# Patient Record
Sex: FEMALE | Race: AMERICAN INDIAN OR ALASKA NATIVE | NOT HISPANIC OR LATINO
[De-identification: names, ages, dates, MRNs, and addresses within clinical notes are randomized per-mention and may not be internally consistent; named-entity substitution may affect disease eponyms.]

---

## 2017-04-13 ENCOUNTER — APPOINTMENT (OUTPATIENT)
Dept: UROLOGY | Facility: CLINIC | Age: 47
End: 2017-04-13

## 2017-06-01 ENCOUNTER — OUTPATIENT (OUTPATIENT)
Dept: OUTPATIENT SERVICES | Facility: HOSPITAL | Age: 47
LOS: 1 days | Discharge: HOME | End: 2017-06-01

## 2017-06-26 ENCOUNTER — OUTPATIENT (OUTPATIENT)
Dept: OUTPATIENT SERVICES | Facility: HOSPITAL | Age: 47
LOS: 1 days | Discharge: HOME | End: 2017-06-26

## 2017-06-28 DIAGNOSIS — N39.0 URINARY TRACT INFECTION, SITE NOT SPECIFIED: ICD-10-CM

## 2017-07-06 ENCOUNTER — OUTPATIENT (OUTPATIENT)
Dept: OUTPATIENT SERVICES | Facility: HOSPITAL | Age: 47
LOS: 1 days | Discharge: HOME | End: 2017-07-06

## 2017-07-06 DIAGNOSIS — N39.0 URINARY TRACT INFECTION, SITE NOT SPECIFIED: ICD-10-CM

## 2017-07-10 ENCOUNTER — OUTPATIENT (OUTPATIENT)
Dept: OUTPATIENT SERVICES | Facility: HOSPITAL | Age: 47
LOS: 1 days | Discharge: HOME | End: 2017-07-10

## 2017-07-10 DIAGNOSIS — N39.0 URINARY TRACT INFECTION, SITE NOT SPECIFIED: ICD-10-CM

## 2017-07-13 ENCOUNTER — OUTPATIENT (OUTPATIENT)
Dept: OUTPATIENT SERVICES | Facility: HOSPITAL | Age: 47
LOS: 1 days | Discharge: HOME | End: 2017-07-13

## 2017-07-14 DIAGNOSIS — N39.0 URINARY TRACT INFECTION, SITE NOT SPECIFIED: ICD-10-CM

## 2018-01-18 ENCOUNTER — APPOINTMENT (OUTPATIENT)
Dept: UROGYNECOLOGY | Facility: CLINIC | Age: 48
End: 2018-01-18

## 2018-01-18 ENCOUNTER — OUTPATIENT (OUTPATIENT)
Dept: OUTPATIENT SERVICES | Facility: HOSPITAL | Age: 48
LOS: 1 days | Discharge: HOME | End: 2018-01-18

## 2018-01-18 VITALS
HEART RATE: 74 BPM | SYSTOLIC BLOOD PRESSURE: 136 MMHG | BODY MASS INDEX: 24.07 KG/M2 | WEIGHT: 141 LBS | HEIGHT: 64 IN | DIASTOLIC BLOOD PRESSURE: 84 MMHG

## 2018-01-18 DIAGNOSIS — M51.26 OTHER INTERVERTEBRAL DISC DISPLACEMENT, LUMBAR REGION: ICD-10-CM

## 2018-01-19 DIAGNOSIS — R33.9 RETENTION OF URINE, UNSPECIFIED: ICD-10-CM

## 2018-02-01 ENCOUNTER — OTHER (OUTPATIENT)
Age: 48
End: 2018-02-01

## 2018-02-02 ENCOUNTER — OUTPATIENT (OUTPATIENT)
Dept: OUTPATIENT SERVICES | Facility: HOSPITAL | Age: 48
LOS: 1 days | Discharge: HOME | End: 2018-02-02

## 2018-02-02 DIAGNOSIS — R33.9 RETENTION OF URINE, UNSPECIFIED: ICD-10-CM

## 2018-02-25 ENCOUNTER — RESULT CHARGE (OUTPATIENT)
Age: 48
End: 2018-02-25

## 2018-02-26 ENCOUNTER — APPOINTMENT (OUTPATIENT)
Age: 48
End: 2018-02-26

## 2018-02-26 ENCOUNTER — OUTPATIENT (OUTPATIENT)
Dept: OUTPATIENT SERVICES | Facility: HOSPITAL | Age: 48
LOS: 1 days | Discharge: HOME | End: 2018-02-26

## 2018-02-26 RX ORDER — BACLOFEN 10 MG/1
10 TABLET ORAL
Qty: 60 | Refills: 1 | Status: DISCONTINUED | COMMUNITY
Start: 2018-01-18 | End: 2018-02-26

## 2018-02-26 RX ORDER — TAMSULOSIN HYDROCHLORIDE 0.4 MG/1
0.4 CAPSULE ORAL
Qty: 30 | Refills: 1 | Status: DISCONTINUED | COMMUNITY
Start: 2018-01-18 | End: 2018-02-26

## 2018-02-27 DIAGNOSIS — N39.0 URINARY TRACT INFECTION, SITE NOT SPECIFIED: ICD-10-CM

## 2018-02-27 LAB
BILIRUB UR QL STRIP: NORMAL
CLARITY UR: CLEAR
COLLECTION METHOD: NORMAL
GLUCOSE UR-MCNC: NORMAL
HCG UR QL: NORMAL EU/DL
HGB UR QL STRIP.AUTO: NORMAL
KETONES UR-MCNC: NORMAL
LEUKOCYTE ESTERASE UR QL STRIP: 500
NITRITE UR QL STRIP: NORMAL
PH UR STRIP: 9
PROT UR STRIP-MCNC: 30
SP GR UR STRIP: 1.01

## 2018-03-02 LAB — BACTERIA UR CULT: ABNORMAL

## 2018-03-06 ENCOUNTER — APPOINTMENT (OUTPATIENT)
Dept: UROLOGY | Facility: CLINIC | Age: 48
End: 2018-03-06
Payer: COMMERCIAL

## 2018-03-06 VITALS
WEIGHT: 141 LBS | HEIGHT: 64 IN | DIASTOLIC BLOOD PRESSURE: 78 MMHG | HEART RATE: 76 BPM | BODY MASS INDEX: 24.07 KG/M2 | SYSTOLIC BLOOD PRESSURE: 136 MMHG

## 2018-03-06 PROCEDURE — 99243 OFF/OP CNSLTJ NEW/EST LOW 30: CPT

## 2018-03-16 ENCOUNTER — APPOINTMENT (OUTPATIENT)
Dept: UROLOGY | Facility: CLINIC | Age: 48
End: 2018-03-16
Payer: COMMERCIAL

## 2018-03-16 VITALS
WEIGHT: 141 LBS | HEART RATE: 79 BPM | HEIGHT: 64 IN | DIASTOLIC BLOOD PRESSURE: 77 MMHG | SYSTOLIC BLOOD PRESSURE: 147 MMHG | BODY MASS INDEX: 24.07 KG/M2

## 2018-03-16 DIAGNOSIS — Z00.00 ENCOUNTER FOR GENERAL ADULT MEDICAL EXAMINATION W/OUT ABNORMAL FINDINGS: ICD-10-CM

## 2018-03-16 PROCEDURE — 99213 OFFICE O/P EST LOW 20 MIN: CPT

## 2018-05-07 ENCOUNTER — OUTPATIENT (OUTPATIENT)
Dept: OUTPATIENT SERVICES | Facility: HOSPITAL | Age: 48
LOS: 1 days | Discharge: HOME | End: 2018-05-07

## 2018-05-07 ENCOUNTER — APPOINTMENT (OUTPATIENT)
Age: 48
End: 2018-05-07

## 2018-05-07 VITALS
BODY MASS INDEX: 24.75 KG/M2 | HEIGHT: 64 IN | DIASTOLIC BLOOD PRESSURE: 84 MMHG | HEART RATE: 65 BPM | WEIGHT: 145 LBS | SYSTOLIC BLOOD PRESSURE: 131 MMHG

## 2018-05-07 DIAGNOSIS — Z87.898 PERSONAL HISTORY OF OTHER SPECIFIED CONDITIONS: ICD-10-CM

## 2018-05-07 DIAGNOSIS — Z87.440 PERSONAL HISTORY OF URINARY (TRACT) INFECTIONS: ICD-10-CM

## 2018-05-07 DIAGNOSIS — Z96.0 PRESENCE OF UROGENITAL IMPLANTS: ICD-10-CM

## 2018-05-07 RX ORDER — NITROFURANTOIN MACROCRYSTALS 100 MG/1
100 CAPSULE ORAL
Qty: 14 | Refills: 0 | Status: DISCONTINUED | COMMUNITY
Start: 2018-02-12 | End: 2018-05-07

## 2018-05-07 RX ORDER — CEFUROXIME AXETIL 250 MG/1
250 TABLET ORAL
Qty: 14 | Refills: 0 | Status: DISCONTINUED | COMMUNITY
Start: 2017-10-13 | End: 2018-05-07

## 2018-05-07 RX ORDER — NITROFURANTOIN MACROCRYSTALS 100 MG/1
100 CAPSULE ORAL
Qty: 14 | Refills: 0 | Status: DISCONTINUED | COMMUNITY
Start: 2018-02-26 | End: 2018-05-07

## 2018-05-08 DIAGNOSIS — R33.9 RETENTION OF URINE, UNSPECIFIED: ICD-10-CM

## 2018-05-10 LAB — BACTERIA UR CULT: ABNORMAL

## 2018-06-14 ENCOUNTER — APPOINTMENT (OUTPATIENT)
Age: 48
End: 2018-06-14

## 2018-06-14 VITALS
WEIGHT: 144 LBS | BODY MASS INDEX: 24.59 KG/M2 | HEIGHT: 64 IN | SYSTOLIC BLOOD PRESSURE: 128 MMHG | HEART RATE: 67 BPM | DIASTOLIC BLOOD PRESSURE: 78 MMHG

## 2018-09-04 ENCOUNTER — APPOINTMENT (OUTPATIENT)
Dept: UROLOGY | Facility: CLINIC | Age: 48
End: 2018-09-04

## 2018-09-20 ENCOUNTER — APPOINTMENT (OUTPATIENT)
Age: 48
End: 2018-09-20

## 2018-10-15 ENCOUNTER — APPOINTMENT (OUTPATIENT)
Dept: UROLOGY | Facility: CLINIC | Age: 48
End: 2018-10-15
Payer: COMMERCIAL

## 2018-10-15 VITALS
WEIGHT: 144 LBS | DIASTOLIC BLOOD PRESSURE: 73 MMHG | HEIGHT: 64 IN | BODY MASS INDEX: 24.59 KG/M2 | SYSTOLIC BLOOD PRESSURE: 127 MMHG | HEART RATE: 69 BPM

## 2018-10-15 DIAGNOSIS — N20.0 CALCULUS OF KIDNEY: ICD-10-CM

## 2018-10-15 DIAGNOSIS — Z78.9 OTHER SPECIFIED HEALTH STATUS: ICD-10-CM

## 2018-10-15 DIAGNOSIS — N32.9 BLADDER DISORDER, UNSPECIFIED: ICD-10-CM

## 2018-10-15 PROCEDURE — 99214 OFFICE O/P EST MOD 30 MIN: CPT

## 2018-12-03 ENCOUNTER — APPOINTMENT (OUTPATIENT)
Dept: UROLOGY | Facility: CLINIC | Age: 48
End: 2018-12-03
Payer: COMMERCIAL

## 2018-12-03 PROCEDURE — 52000 CYSTOURETHROSCOPY: CPT

## 2018-12-04 ENCOUNTER — APPOINTMENT (OUTPATIENT)
Dept: UROLOGY | Facility: CLINIC | Age: 48
End: 2018-12-04
Payer: COMMERCIAL

## 2018-12-04 VITALS
HEIGHT: 64 IN | DIASTOLIC BLOOD PRESSURE: 84 MMHG | SYSTOLIC BLOOD PRESSURE: 136 MMHG | BODY MASS INDEX: 24.59 KG/M2 | HEART RATE: 75 BPM | WEIGHT: 144 LBS

## 2018-12-04 PROCEDURE — 99213 OFFICE O/P EST LOW 20 MIN: CPT

## 2018-12-11 ENCOUNTER — APPOINTMENT (OUTPATIENT)
Dept: UROLOGY | Facility: HOSPITAL | Age: 48
End: 2018-12-11
Payer: COMMERCIAL

## 2018-12-11 ENCOUNTER — RESULT REVIEW (OUTPATIENT)
Age: 48
End: 2018-12-11

## 2018-12-11 ENCOUNTER — OUTPATIENT (OUTPATIENT)
Dept: OUTPATIENT SERVICES | Facility: HOSPITAL | Age: 48
LOS: 1 days | Discharge: HOME | End: 2018-12-11

## 2018-12-11 VITALS
WEIGHT: 139.99 LBS | DIASTOLIC BLOOD PRESSURE: 80 MMHG | HEIGHT: 64 IN | RESPIRATION RATE: 17 BRPM | OXYGEN SATURATION: 100 % | HEART RATE: 80 BPM | TEMPERATURE: 97 F | SYSTOLIC BLOOD PRESSURE: 136 MMHG

## 2018-12-11 VITALS — SYSTOLIC BLOOD PRESSURE: 120 MMHG | DIASTOLIC BLOOD PRESSURE: 76 MMHG | HEART RATE: 64 BPM

## 2018-12-11 DIAGNOSIS — N32.9 BLADDER DISORDER, UNSPECIFIED: ICD-10-CM

## 2018-12-11 DIAGNOSIS — Z98.891 HISTORY OF UTERINE SCAR FROM PREVIOUS SURGERY: Chronic | ICD-10-CM

## 2018-12-11 DIAGNOSIS — D41.4 NEOPLASM OF UNCERTAIN BEHAVIOR OF BLADDER: ICD-10-CM

## 2018-12-11 PROCEDURE — 52204 CYSTOSCOPY W/BIOPSY(S): CPT

## 2018-12-11 RX ORDER — SODIUM CHLORIDE 9 MG/ML
1000 INJECTION, SOLUTION INTRAVENOUS
Qty: 0 | Refills: 0 | Status: DISCONTINUED | OUTPATIENT
Start: 2018-12-11 | End: 2018-12-26

## 2018-12-11 RX ORDER — OXYCODONE AND ACETAMINOPHEN 5; 325 MG/1; MG/1
1 TABLET ORAL ONCE
Qty: 0 | Refills: 0 | Status: DISCONTINUED | OUTPATIENT
Start: 2018-12-11 | End: 2018-12-11

## 2018-12-11 RX ORDER — MORPHINE SULFATE 50 MG/1
2 CAPSULE, EXTENDED RELEASE ORAL
Qty: 0 | Refills: 0 | Status: DISCONTINUED | OUTPATIENT
Start: 2018-12-11 | End: 2018-12-11

## 2018-12-11 RX ORDER — ONDANSETRON 8 MG/1
4 TABLET, FILM COATED ORAL ONCE
Qty: 0 | Refills: 0 | Status: DISCONTINUED | OUTPATIENT
Start: 2018-12-11 | End: 2018-12-26

## 2018-12-11 RX ORDER — HYDROMORPHONE HYDROCHLORIDE 2 MG/ML
0.5 INJECTION INTRAMUSCULAR; INTRAVENOUS; SUBCUTANEOUS
Qty: 0 | Refills: 0 | Status: DISCONTINUED | OUTPATIENT
Start: 2018-12-11 | End: 2018-12-11

## 2018-12-11 RX ORDER — PHENAZOPYRIDINE HCL 100 MG
1 TABLET ORAL
Qty: 4 | Refills: 0
Start: 2018-12-11 | End: 2018-12-12

## 2018-12-11 RX ADMIN — OXYCODONE AND ACETAMINOPHEN 1 TABLET(S): 5; 325 TABLET ORAL at 10:55

## 2018-12-11 RX ADMIN — SODIUM CHLORIDE 100 MILLILITER(S): 9 INJECTION, SOLUTION INTRAVENOUS at 10:39

## 2018-12-11 RX ADMIN — HYDROMORPHONE HYDROCHLORIDE 0.5 MILLIGRAM(S): 2 INJECTION INTRAMUSCULAR; INTRAVENOUS; SUBCUTANEOUS at 10:55

## 2018-12-11 RX ADMIN — HYDROMORPHONE HYDROCHLORIDE 0.5 MILLIGRAM(S): 2 INJECTION INTRAMUSCULAR; INTRAVENOUS; SUBCUTANEOUS at 10:45

## 2018-12-11 NOTE — ASU DISCHARGE PLAN (ADULT/PEDIATRIC). - MEDICATION SUMMARY - MEDICATIONS TO TAKE
I will START or STAY ON the medications listed below when I get home from the hospital:    Pyridium 100 mg oral tablet  -- 1 tab(s) by mouth 2 times a day -for bladder spasms   -- May discolor urine or feces.  Medication should be taken with plenty of water.  Take with food or milk.    -- Indication: For N32.9,[id number]    SMZ-TMP  mg-160 mg oral tablet  -- 1 tab(s) by mouth 2 times a day (with meals)   -- Avoid prolonged or excessive exposure to direct and/or artificial sunlight while taking this medication.  Finish all this medication unless otherwise directed by prescriber.  Medication should be taken with plenty of water.    -- Indication: For N32.9,61890,07820

## 2018-12-11 NOTE — BRIEF OPERATIVE NOTE - PROCEDURE
<<-----Click on this checkbox to enter Procedure Biopsy of bladder neck  12/11/2018    Active  MPINEDA3  Bladder biopsy, transurethral  12/11/2018    Active  [email or web address]

## 2018-12-11 NOTE — BRIEF OPERATIVE NOTE - POST-OP DX
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

## 2018-12-11 NOTE — PRE-ANESTHESIA EVALUATION ADULT - NSANTHOSAYNRD_GEN_A_CORE
No. MARIA EUGENIA screening performed.  STOP BANG Legend: 0-2 = LOW Risk; 3-4 = INTERMEDIATE Risk; 5-8 = HIGH Risk

## 2018-12-11 NOTE — BRIEF OPERATIVE NOTE - OPERATION/FINDINGS
posterior and left lateral bladder neck biopsy of redden bladder lesion. papillary lesion at bladder neck biopsy as well

## 2018-12-14 LAB — SURGICAL PATHOLOGY STUDY: SIGNIFICANT CHANGE UP

## 2018-12-17 DIAGNOSIS — N32.9 BLADDER DISORDER, UNSPECIFIED: ICD-10-CM

## 2018-12-17 DIAGNOSIS — N32.89 OTHER SPECIFIED DISORDERS OF BLADDER: ICD-10-CM

## 2018-12-17 DIAGNOSIS — N30.80 OTHER CYSTITIS WITHOUT HEMATURIA: ICD-10-CM

## 2018-12-17 PROBLEM — R33.9 RETENTION OF URINE, UNSPECIFIED: Chronic | Status: ACTIVE | Noted: 2018-12-11

## 2018-12-21 ENCOUNTER — APPOINTMENT (OUTPATIENT)
Dept: UROLOGY | Facility: CLINIC | Age: 48
End: 2018-12-21

## 2019-01-25 ENCOUNTER — EMERGENCY (EMERGENCY)
Facility: HOSPITAL | Age: 49
LOS: 0 days | Discharge: HOME | End: 2019-01-25
Attending: EMERGENCY MEDICINE | Admitting: EMERGENCY MEDICINE

## 2019-01-25 VITALS
TEMPERATURE: 99 F | WEIGHT: 139.99 LBS | RESPIRATION RATE: 18 BRPM | DIASTOLIC BLOOD PRESSURE: 88 MMHG | OXYGEN SATURATION: 98 % | HEART RATE: 82 BPM | HEIGHT: 63 IN | SYSTOLIC BLOOD PRESSURE: 152 MMHG

## 2019-01-25 DIAGNOSIS — N39.0 URINARY TRACT INFECTION, SITE NOT SPECIFIED: ICD-10-CM

## 2019-01-25 DIAGNOSIS — Z79.899 OTHER LONG TERM (CURRENT) DRUG THERAPY: ICD-10-CM

## 2019-01-25 DIAGNOSIS — Z98.891 HISTORY OF UTERINE SCAR FROM PREVIOUS SURGERY: Chronic | ICD-10-CM

## 2019-01-25 DIAGNOSIS — R33.9 RETENTION OF URINE, UNSPECIFIED: ICD-10-CM

## 2019-01-25 LAB
ANION GAP SERPL CALC-SCNC: 16 MMOL/L — HIGH (ref 7–14)
APPEARANCE UR: ABNORMAL
BILIRUB UR-MCNC: NEGATIVE — SIGNIFICANT CHANGE UP
BUN SERPL-MCNC: 17 MG/DL — SIGNIFICANT CHANGE UP (ref 10–20)
CALCIUM SERPL-MCNC: 9.2 MG/DL — SIGNIFICANT CHANGE UP (ref 8.5–10.1)
CHLORIDE SERPL-SCNC: 101 MMOL/L — SIGNIFICANT CHANGE UP (ref 98–110)
CO2 SERPL-SCNC: 21 MMOL/L — SIGNIFICANT CHANGE UP (ref 17–32)
COLOR SPEC: YELLOW — SIGNIFICANT CHANGE UP
CREAT SERPL-MCNC: 0.8 MG/DL — SIGNIFICANT CHANGE UP (ref 0.7–1.5)
DIFF PNL FLD: ABNORMAL
GLUCOSE SERPL-MCNC: 83 MG/DL — SIGNIFICANT CHANGE UP (ref 70–99)
GLUCOSE UR QL: NEGATIVE MG/DL — SIGNIFICANT CHANGE UP
KETONES UR-MCNC: 40
LEUKOCYTE ESTERASE UR-ACNC: ABNORMAL
NITRITE UR-MCNC: POSITIVE
PH UR: 6 — SIGNIFICANT CHANGE UP (ref 5–8)
POTASSIUM SERPL-MCNC: 4.3 MMOL/L — SIGNIFICANT CHANGE UP (ref 3.5–5)
POTASSIUM SERPL-SCNC: 4.3 MMOL/L — SIGNIFICANT CHANGE UP (ref 3.5–5)
PROT UR-MCNC: NEGATIVE MG/DL — SIGNIFICANT CHANGE UP
SODIUM SERPL-SCNC: 138 MMOL/L — SIGNIFICANT CHANGE UP (ref 135–146)
SP GR SPEC: 1.01 — SIGNIFICANT CHANGE UP (ref 1.01–1.03)
UROBILINOGEN FLD QL: 0.2 MG/DL — SIGNIFICANT CHANGE UP (ref 0.2–0.2)

## 2019-01-25 RX ORDER — CEFUROXIME AXETIL 250 MG
1 TABLET ORAL
Qty: 14 | Refills: 0
Start: 2019-01-25 | End: 2019-01-31

## 2019-01-25 NOTE — ED ADULT NURSE NOTE - OBJECTIVE STATEMENT
49 y/o female presents to the ED c/o urinary retention x 8 hours. Endorses abdominal fullness sensation. Denies having any c/o pain

## 2019-01-25 NOTE — ED PROVIDER NOTE - PHYSICAL EXAMINATION
Constitutional: Well developed, well nourished. NAD.  Head: Normocephalic, atraumatic.  Eyes: PERRL. EOMI.  ENT: No nasal discharge. Mucous membranes moist.  Neck: Supple. Painless ROM.  Cardiovascular: Normal S1, S2. Regular rate and rhythm. No murmurs, rubs, or gallops.  Pulmonary: Normal respiratory rate and effort. Lungs clear to auscultation bilaterally. No wheezing, rales, or rhonchi.  Abdominal: Soft. Nondistended. + suprapubic fullness, mild tenderness. No rebound, guarding, rigidity.  Extremities. Pelvis stable. No lower extremity edema, symmetric calves.  Skin: No rashes, cyanosis.  Neuro: AAOx3. No focal neurological deficits.  Psych: Normal mood. Normal affect.

## 2019-01-25 NOTE — ED PROVIDER NOTE - MEDICAL DECISION MAKING DETAILS
Pt with 5 hours of urinary retention found to hav UTI. Johns catheter with leg bag placed and pt will f/up with her urologist Dr. Munoz. Instructed to take the abx as prescribed for 1 week.

## 2019-01-25 NOTE — ED PROVIDER NOTE - PROGRESS NOTE DETAILS
Bedside sono with >500cc in bladder. Will place centeno. Johns drained 650cc, + UTI. Will give cefuroxime, pt sensitive in the past. Will d/c with abx and leg bag. PT will f/up with Dr. Munoz.

## 2019-01-25 NOTE — ED PROVIDER NOTE - ATTENDING CONTRIBUTION TO CARE
I personally evaluated the patient. I reviewed the Resident’s or Physician Assistant’s note (as assigned above), and agree with the findings and plan except as documented in my note.  48 yr F, hx of intermittent urinary retention, with complaints of not urinating for the past 5 hours. Managed outpt by urologist Dr. Munoz,. No new meds, no f/c/n/v/d. VS reviewed, pt well appearing, NAD. Head ncat, normal s1s2 without any murmurs, Lungs CTAB with normal work of breathing. abd +BS, s/nd/nt, extremities wnl, neuro exam grossly normal. No acute skin rashes. Johns catheter placed with urine outpt > 600cc. Plan is labs, UA reassess.

## 2019-01-25 NOTE — ED PROVIDER NOTE - CARE PROVIDER_API CALL
Jg Munoz), [facility]  [address] [place] [address]  [address] [place] [id number]  Phone: [phone]  Fax: [phone]

## 2019-01-25 NOTE — ED PROVIDER NOTE - OBJECTIVE STATEMENT
Pt is a 47 y/o female with hx of recurrent urinary retention (sees Dr. Munoz) who presents to ED with inability to urinate for 8 hours. Pt c/o abdominal fullness that is mild, worse with palpation but denies pain, n/v/d. No dysuria, frequency, hematuria, fever.

## 2019-01-25 NOTE — ED ADULT NURSE NOTE - NSIMPLEMENTINTERV_GEN_ALL_ED
Implemented All Universal Safety Interventions:  [place] to call system. Call bell, personal items and telephone within reach. Instruct patient to call for assistance. Room bathroom lighting operational. Non-slip footwear when patient is off stretcher. Physically safe environment: no spills, clutter or unnecessary equipment. Stretcher in lowest position, wheels locked, appropriate side rails in place.

## 2019-01-25 NOTE — ED PROVIDER NOTE - NS ED ROS FT
Constitutional: No fever, chills.  Eyes: No visual changes.  ENT: No hearing changes. No sore throat.  Neck: No neck pain or stiffness.  Cardiovascular: No chest pain, palpitations, edema.  Pulmonary: No SOB, cough. No hemoptysis.  Abdominal: No abdominal pain, nausea, vomiting, diarrhea. + abd fullness.  : No dysuria, frequency. + inability to urinate.  Neuro: No headache, syncope, dizziness.  MS: No back pain. No calf pain/swelling.  Psych: No suicidal ideations.

## 2019-01-28 ENCOUNTER — APPOINTMENT (OUTPATIENT)
Dept: UROLOGY | Facility: CLINIC | Age: 49
End: 2019-01-28
Payer: COMMERCIAL

## 2019-01-28 VITALS
DIASTOLIC BLOOD PRESSURE: 83 MMHG | WEIGHT: 144 LBS | HEIGHT: 64 IN | SYSTOLIC BLOOD PRESSURE: 149 MMHG | BODY MASS INDEX: 24.59 KG/M2 | HEART RATE: 91 BPM

## 2019-01-28 PROCEDURE — 99213 OFFICE O/P EST LOW 20 MIN: CPT

## 2019-01-28 NOTE — HISTORY OF PRESENT ILLNESS
[FreeTextEntry1] : 48-year-old woman followed for recurrent UTI which have been managed by Dr Bailon -- is supposed to self cath twice per day - but patient has not been doing it on schedule\par \par Dr bailon also saw red plaques in the bladder and recommended i do bladder biopsy which were not malignant on pathology\par  \par US Renal- normal renal sonogram\par Xray Kidney Ureter Bladder-- no stones seen on KUB. \par \par Recently developed new bout of retention - had centeno overnight and she removed it herself and is now back to normal voiding.\par \par Was suppposed to have pelvic PT for pelvic hypertonicity per Dr Bailon but never did this

## 2019-01-28 NOTE — PHYSICAL EXAM
[General Appearance - Well Developed] : well developed [General Appearance - Well Nourished] : well nourished [Normal Appearance] : normal appearance [Well Groomed] : well groomed [General Appearance - In No Acute Distress] : no acute distress [Abdomen Soft] : soft [Abdomen Tenderness] : non-tender [Costovertebral Angle Tenderness] : no ~M costovertebral angle tenderness [Urinary Bladder Findings] : the bladder was normal on palpation [Skin Color & Pigmentation] : normal skin color and pigmentation [Edema] : no peripheral edema [] : no respiratory distress [Respiration, Rhythm And Depth] : normal respiratory rhythm and effort [Exaggerated Use Of Accessory Muscles For Inspiration] : no accessory muscle use [Oriented To Time, Place, And Person] : oriented to person, place, and time [Affect] : the affect was normal [Mood] : the mood was normal [Not Anxious] : not anxious [Normal Station and Gait] : the gait and station were normal for the patient's age [No Focal Deficits] : no focal deficits

## 2019-01-29 RX ORDER — CIPROFLOXACIN LACTATE 400MG/40ML
1 VIAL (ML) INTRAVENOUS
Qty: 14 | Refills: 0
Start: 2019-01-29 | End: 2019-02-04

## 2019-03-06 ENCOUNTER — APPOINTMENT (OUTPATIENT)
Dept: UROLOGY | Facility: CLINIC | Age: 49
End: 2019-03-06

## 2019-03-15 ENCOUNTER — APPOINTMENT (OUTPATIENT)
Dept: UROLOGY | Facility: CLINIC | Age: 49
End: 2019-03-15

## 2019-08-22 RX ORDER — NITROFURANTOIN (MONOHYDRATE/MACROCRYSTALS) 25; 75 MG/1; MG/1
100 CAPSULE ORAL TWICE DAILY
Qty: 14 | Refills: 0 | Status: COMPLETED | COMMUNITY
Start: 2019-08-22 | End: 2019-08-29

## 2019-09-16 NOTE — ASU PATIENT PROFILE, ADULT - SURGERY TIME
Dr. Doroteo Moreno is present.  Patient of Dr. Doroteo Moreno.     Last is here today for BCG treatment. he is receiving induction BCG.  This will be treatment number 2 of 6.     The patient denies Fevers, Chills, Flu like symptoms, SOB, Cough/Chest pain, Hematuria, Urgency  and Frequency.  Positive symptoms following last treatment were: none.    Microscopic urinalysis was performed today and was deemed acceptable to proceed.  Patient received Full strength BCG. This was instilled into the bladder via 12 [place] red rubber catheter.  A PVR of 50 cc was noted. The patient tolerated the procedure well.     Lot Number: [id number]    Expiration date: 6/2020  Diagnosis: Bladder cancer  Ordering: Dr. Doroteo Moreno    Above preformed by Zari MUÑOZ MA   09:00

## 2019-09-23 ENCOUNTER — APPOINTMENT (OUTPATIENT)
Dept: UROGYNECOLOGY | Facility: CLINIC | Age: 49
End: 2019-09-23
Payer: COMMERCIAL

## 2019-09-23 VITALS
WEIGHT: 144 LBS | SYSTOLIC BLOOD PRESSURE: 129 MMHG | HEART RATE: 75 BPM | DIASTOLIC BLOOD PRESSURE: 84 MMHG | BODY MASS INDEX: 24.59 KG/M2 | HEIGHT: 64 IN

## 2019-09-23 PROCEDURE — 51701 INSERT BLADDER CATHETER: CPT

## 2019-09-23 PROCEDURE — 99214 OFFICE O/P EST MOD 30 MIN: CPT | Mod: 25

## 2019-09-27 RX ORDER — CEFUROXIME AXETIL 500 MG/1
500 TABLET ORAL
Qty: 10 | Refills: 0 | Status: DISCONTINUED | COMMUNITY
Start: 2018-11-30 | End: 2019-09-27

## 2019-09-27 NOTE — COUNSELING
[FreeTextEntry1] : \par \par Please start cathing at least twice a day (once in the morning and once at night)\par \par Please start taking the flomax nightly to help relax the urethra\par \par Please start taking the flexeril (muscle relaxant) twice a day to help relax the pelvic muscles. It can make you sleepy\par \par Please call my office if you have any issues with the cost or side effects of the medication.\par \par Schedule 6 week med check with my PANathaniel (GEOVANNA). We will check to see if you empty your bladder better. Please bring one of your catheters to the visit since the catheter in the office did not pass through the urethra\par

## 2019-09-27 NOTE — DISCUSSION/SUMMARY
[FreeTextEntry1] : \par Incomplete bladder emptying-\par Discussed further management including doing nothing which can cause kidney damage and her frequency will not improve, start daily intermittent catherization with +/- flomax and flexeril, or suprapubic catheter. The patient voiced understanding and agrees to start cathing at least 2 times per day and start additional medical management. The risks and benefits of flexeril and flomax was reviewed. She will return for a PVR check and she will bring her smaller catheters in order for us to be able to catherize her.

## 2019-09-27 NOTE — HISTORY OF PRESENT ILLNESS
[FreeTextEntry1] : \par The patient is here for follow up for incomplete bladder emptying\par Last seen 6/14/18 for a repeat cysto by me for KIMBERLY that showed a bladder lesion. I referred the patient to Dr Munoz and the bladder lesions resulted in negative for malignancy\par \par History of intermittent incomplete bladder emptying\par 7/21/17 UDS: +DOI, +obstructive voiding, possible DSD\par Neuro evaluation: negative\par s/p flomax and baclofen-no change in PVR\par \par Today, the patient reports that she catherizes once every couple of days and is very bothered by frequency\par \par

## 2019-11-04 ENCOUNTER — APPOINTMENT (OUTPATIENT)
Dept: UROGYNECOLOGY | Facility: CLINIC | Age: 49
End: 2019-11-04

## 2019-11-14 ENCOUNTER — APPOINTMENT (OUTPATIENT)
Dept: UROGYNECOLOGY | Facility: CLINIC | Age: 49
End: 2019-11-14
Payer: COMMERCIAL

## 2019-11-14 VITALS
BODY MASS INDEX: 25.61 KG/M2 | SYSTOLIC BLOOD PRESSURE: 149 MMHG | DIASTOLIC BLOOD PRESSURE: 87 MMHG | HEART RATE: 94 BPM | WEIGHT: 150 LBS | HEIGHT: 64 IN

## 2019-11-14 PROCEDURE — 99213 OFFICE O/P EST LOW 20 MIN: CPT

## 2019-11-14 NOTE — COUNSELING
[FreeTextEntry1] : \par I will speak to Dr. Munoz or his colleagues regarding the possible stricture.\par \par If you feel like you have an infection it is important for you to call our office and we will arrange testing of your urine.\par \par Please schedule a bladder sonogram, PVR check.\par \par Please continue taking Flexeril 5 mg, twice a day. Refills sent to your pharmacy.\par \par Please continue taking Flomax 0.4 mg at bedtime. Refills sent to your pharmacy.\par \par Please call my office if you have any issues with the cost or side effects of the medication. \par \par Please follow up in 3 months. PVR check.

## 2019-11-14 NOTE — HISTORY OF PRESENT ILLNESS
[FreeTextEntry1] : \par Patient is present for follow up for incomplete bladder emptying\par Last seen 9/23 for follow up for incomplete bladder emptying\par \par History of intermittent incomplete bladder emptying\par 7/21/17 UDS: +DOI, +obstructive voiding, possible DSD\par Neuro evaluation: negative\par s/p flomax and baclofen-no change in PVR\par \par Flexeril 5 mg BID\par Flomax 0.4 mg at bedtime\par \par Today, patient states she is not cathing twice a day as advised. Only caths when she feels she is not emptying well. She last self cath last night. She is now having difficulty passing the catheter through the urethra and needs to manipulate.

## 2019-11-14 NOTE — DISCUSSION/SUMMARY
[FreeTextEntry1] : \par Incomplete Bladder Emptying:\par Recommended speaking to Urology in regards to possible urethral stricture\par Recommended bladder sonogram for PVR check\par Advised to continue Flexeril 5 mg BID. Refills provided. 90 days supply with 0 refills.\par Advised to continue Flomax 0/4 mg at bedtime. Refills provided. 90 days supply with 0 refills.\par Will follow up in 3 months or earlier if she has any issues. PVR check.

## 2020-02-12 ENCOUNTER — TRANSCRIPTION ENCOUNTER (OUTPATIENT)
Age: 50
End: 2020-02-12

## 2020-02-24 ENCOUNTER — APPOINTMENT (OUTPATIENT)
Dept: UROGYNECOLOGY | Facility: CLINIC | Age: 50
End: 2020-02-24

## 2020-03-23 ENCOUNTER — APPOINTMENT (OUTPATIENT)
Dept: UROGYNECOLOGY | Facility: CLINIC | Age: 50
End: 2020-03-23

## 2020-04-02 PROBLEM — N32.9 LESION OF BLADDER: Status: ACTIVE | Noted: 2018-10-15

## 2020-04-02 PROBLEM — N32.9 LESION OF BLADDER: Status: RESOLVED | Noted: 2018-06-14 | Resolved: 2020-04-02

## 2020-04-25 ENCOUNTER — MESSAGE (OUTPATIENT)
Age: 50
End: 2020-04-25

## 2020-05-13 ENCOUNTER — APPOINTMENT (OUTPATIENT)
Dept: DISASTER EMERGENCY | Facility: HOSPITAL | Age: 50
End: 2020-05-13

## 2020-05-13 LAB
SARS-COV-2 IGG SERPL IA-ACNC: 4.8 INDEX
SARS-COV-2 IGG SERPL QL IA: POSITIVE

## 2020-05-18 ENCOUNTER — APPOINTMENT (OUTPATIENT)
Dept: UROGYNECOLOGY | Facility: CLINIC | Age: 50
End: 2020-05-18
Payer: COMMERCIAL

## 2020-05-18 VITALS
HEIGHT: 64 IN | DIASTOLIC BLOOD PRESSURE: 93 MMHG | SYSTOLIC BLOOD PRESSURE: 158 MMHG | BODY MASS INDEX: 26.46 KG/M2 | TEMPERATURE: 97.2 F | HEART RATE: 109 BPM | WEIGHT: 155 LBS

## 2020-05-18 PROCEDURE — 51701 INSERT BLADDER CATHETER: CPT

## 2020-05-18 PROCEDURE — 99213 OFFICE O/P EST LOW 20 MIN: CPT | Mod: 25

## 2020-05-18 RX ORDER — NITROFURANTOIN (MONOHYDRATE/MACROCRYSTALS) 25; 75 MG/1; MG/1
100 CAPSULE ORAL
Qty: 14 | Refills: 0 | Status: DISCONTINUED | COMMUNITY
Start: 2020-04-21 | End: 2020-05-18

## 2020-05-18 NOTE — COUNSELING
[FreeTextEntry1] : \par We will notify you of your urine results. \par \par Please schedule a blader and kidney ultrasound in June.\par \par Please continue to take Flexeril 5 mg twice a day.\par \par Please continue to take Flomax 0.4 mg at bedtime.\par \par Please call for refills when needed.\par \par Please follow up in 3 months. PVR check.

## 2020-05-18 NOTE — HISTORY OF PRESENT ILLNESS
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 Worsening PVR, 410 cc (prevoid 518 cc)\par \par Today, patient states she self caths about 3-4 times a day after voiding. At times, patient struggles inserting catheter into the urethra due to possible stricture.

## 2020-05-18 NOTE — PHYSICAL EXAM
[Chaperone Present] : A chaperone was present in the examining room during all aspects of the physical examination

## 2020-05-18 NOTE — DISCUSSION/SUMMARY
[FreeTextEntry1] : \par Detrusor/sphincter Dyssynergia:\par Taking Flexeril 5 mg BID. Does not need refills at the moment\par \par Urinary Retention:\par Taking Flomax 0.4 mg at bedtime. Does not need refills at the moment.\par Urine culture obtained.\par Will follow up.\par Will treat accordingly if necessary \par Sent for 3 months Bladder/Renal Ultrasound

## 2020-05-20 ENCOUNTER — RX RENEWAL (OUTPATIENT)
Age: 50
End: 2020-05-20

## 2020-05-20 LAB — BACTERIA UR CULT: NORMAL

## 2020-07-07 ENCOUNTER — APPOINTMENT (OUTPATIENT)
Dept: UROLOGY | Facility: CLINIC | Age: 50
End: 2020-07-07
Payer: COMMERCIAL

## 2020-07-07 VITALS — WEIGHT: 155 LBS | HEIGHT: 64 IN | BODY MASS INDEX: 26.46 KG/M2 | TEMPERATURE: 98 F

## 2020-07-07 DIAGNOSIS — R93.49 ABNORMAL RADIOLOGIC FINDINGS ON DIAGNOSITIC IMAGING OF OTHER URINARY ORGANS: ICD-10-CM

## 2020-07-07 PROCEDURE — 99213 OFFICE O/P EST LOW 20 MIN: CPT

## 2020-07-07 NOTE — REVIEW OF SYSTEMS
[Fever] : no fever [Nasal Discharge] : no nasal discharge [Sore Throat] : no sore throat [Chest Pain] : no chest pain [Cough] : no cough [Shortness Of Breath] : no shortness of breath [Constipation] : no constipation [Diarrhea] : no diarrhea [Dysuria] : no dysuria

## 2020-07-07 NOTE — ASSESSMENT
[FreeTextEntry1] : Possible bladder mass on recent bladder ultrasound. Fully discussed with patient. We'll schedule flexible cystoscopy for better evaluation

## 2020-07-07 NOTE — HISTORY OF PRESENT ILLNESS
[FreeTextEntry1] : 49-year-old with history of incomplete bladder emptying on daily CIC. According to notes she likely has urinary obstruction and possible the detrussor sphincter dyssynergia but I do not have a urodynamic report to review. Recently she had a bladder sonogram that reportedly showed a posterior bladder polypoid mass I do not have the report to review. I did review a sonogram from 4 months ago which showed no mass. She has no change in urination no hematuria

## 2020-08-20 ENCOUNTER — APPOINTMENT (OUTPATIENT)
Dept: UROGYNECOLOGY | Facility: CLINIC | Age: 50
End: 2020-08-20
Payer: COMMERCIAL

## 2020-08-20 VITALS
WEIGHT: 155 LBS | HEIGHT: 64 IN | BODY MASS INDEX: 26.46 KG/M2 | HEART RATE: 109 BPM | DIASTOLIC BLOOD PRESSURE: 93 MMHG | SYSTOLIC BLOOD PRESSURE: 147 MMHG

## 2020-08-20 PROCEDURE — 51702 INSERT TEMP BLADDER CATH: CPT

## 2020-08-20 PROCEDURE — 99213 OFFICE O/P EST LOW 20 MIN: CPT | Mod: 25

## 2020-08-24 NOTE — DISCUSSION/SUMMARY
[FreeTextEntry1] : Incomplete Bladder Emptying:\par \par Advised pt to continue Flomax 04.mg QD and Flexeril 5mg BID.\par Advised to continue to self cath as needed.\par Will follow up in 6 months for med check/PVR check. Pt will schedule sonogram of bladder and kidneys in December.

## 2020-08-24 NOTE — COUNSELING
[FreeTextEntry1] : If you feel like you have an infection it is important for you to call our office and we will arrange testing of your urine.\par \par Please continue taking Flexeril 5mg twice daily and Flomax 0.4 mg once daily. Refills sent to your pharmacy.\par \par Please call my office if you have any issues with the cost or side effects of the medication. \par \par We will mail you a referral for the sonograms of the bladder and kidneys.\par \par Schedule a 6 months follow up med check appointment/PVR check.\par

## 2020-08-24 NOTE — PHYSICAL EXAM
[Chaperone Present] : A chaperone was present in the examining room during all aspects of the physical examination [No Acute Distress] : in no acute distress [Well developed] : well developed [Well Nourished] : ~L well nourished [FreeTextEntry1] : void: 100cc\par Cath: 20cc\par Urethra was prepped in sterile fashion and then a sterile catheter was used by me to drain the bladder.\par

## 2020-08-24 NOTE — HISTORY OF PRESENT ILLNESS
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 Worsening PVR, 410 cc (prevoid 518 cc)\par \par Today, patient states she is happy with Flexeril 5mg BID and Flomax 0.4mg QD and is noticing improvement. Denies side effects. Patient does not feel she has an infection.\par \par Patient would like to continue Flexeril 5 mg BID and Flomax 0.4mg QD.\par

## 2020-10-13 ENCOUNTER — APPOINTMENT (OUTPATIENT)
Dept: UROLOGY | Facility: CLINIC | Age: 50
End: 2020-10-13
Payer: COMMERCIAL

## 2020-10-13 PROCEDURE — 52000 CYSTOURETHROSCOPY: CPT

## 2020-12-10 ENCOUNTER — APPOINTMENT (OUTPATIENT)
Dept: UROGYNECOLOGY | Facility: CLINIC | Age: 50
End: 2020-12-10
Payer: COMMERCIAL

## 2020-12-10 VITALS
DIASTOLIC BLOOD PRESSURE: 91 MMHG | HEART RATE: 85 BPM | HEIGHT: 64 IN | WEIGHT: 161 LBS | BODY MASS INDEX: 27.49 KG/M2 | SYSTOLIC BLOOD PRESSURE: 146 MMHG

## 2020-12-10 PROCEDURE — 51701 INSERT BLADDER CATHETER: CPT

## 2020-12-10 PROCEDURE — 99213 OFFICE O/P EST LOW 20 MIN: CPT | Mod: 25

## 2020-12-10 PROCEDURE — 99072 ADDL SUPL MATRL&STAF TM PHE: CPT

## 2020-12-11 NOTE — PHYSICAL EXAM
[Chaperone Present] : A chaperone was present in the examining room during all aspects of the physical examination [FreeTextEntry1] : Urethra was prepped in sterile fashion and then a sterile catheter was used by me to drain the bladder.\par void: 250cc\par PVR: 10cc [No Acute Distress] : in no acute distress [Well developed] : well developed [Well Nourished] : ~L well nourished

## 2020-12-11 NOTE — HISTORY OF PRESENT ILLNESS
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 Worsening PVR, 410 cc (prevoid 518 cc)\par \par Pt saw Dr Schumacher 7/2020, s/p cysto 10/2020: shows a false passage in the urethra, likely from self cathing. 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 Recommend the patient to follow up in 3 months. \par \par Today, patient states she is happy with Flexeril 5mg BID and Flomax 0.4mg QD and is noticing improvement. Denies side effects. Patient does not feel she has an infection. Pt did not do renal sonogram yet. \par \par Patient would like to continue Flexeril and Flomax and will do renal sono. \par

## 2020-12-11 NOTE — DISCUSSION/SUMMARY
[FreeTextEntry1] : Incomplete Bladder Emptying:\par \par Advised pt to continue Flomax 04.mg QD and Flexeril 5mg BID.\par Advised to stop self cathing at this time as she's emptying her bladder very well. Only self cath if she feels that she did not empty her bladder well. \par \par F/u renal sono, ordered again today. \par

## 2020-12-11 NOTE — COUNSELING
[FreeTextEntry1] : Please continue to take Flexeril 5mg twice a day and Flomax 0.4mg once a day.\par \par Please schedule an appointment with your PCP for blood pressure check and palpitations check.\par \par Please schedule renal sonogram to evaluate kidneys.\par \par You may stop self cathing at this time as you're emptying your bladder well.\par \par If you feel like you have an infection it is important for you to call our office and we will arrange testing of your urine.\par \par Please call my office if you have any issues with the cost or side effects of the medication. \par \par Schedule a 6 months follow up med check appointment.\par

## 2021-02-04 ENCOUNTER — NON-APPOINTMENT (OUTPATIENT)
Age: 51
End: 2021-02-04

## 2021-02-09 RX ORDER — TAMSULOSIN HYDROCHLORIDE 0.4 MG/1
0.4 CAPSULE ORAL
Qty: 30 | Refills: 2 | Status: DISCONTINUED | COMMUNITY
Start: 2019-09-23 | End: 2021-02-09

## 2021-06-16 ENCOUNTER — NON-APPOINTMENT (OUTPATIENT)
Age: 51
End: 2021-06-16

## 2021-07-15 ENCOUNTER — APPOINTMENT (OUTPATIENT)
Dept: UROGYNECOLOGY | Facility: CLINIC | Age: 51
End: 2021-07-15
Payer: COMMERCIAL

## 2021-07-15 VITALS
BODY MASS INDEX: 27.31 KG/M2 | HEART RATE: 88 BPM | HEIGHT: 64 IN | DIASTOLIC BLOOD PRESSURE: 82 MMHG | WEIGHT: 160 LBS | SYSTOLIC BLOOD PRESSURE: 139 MMHG

## 2021-07-15 PROCEDURE — 99213 OFFICE O/P EST LOW 20 MIN: CPT

## 2021-07-15 NOTE — COUNSELING
[FreeTextEntry1] : If you feel like you have an infection it is important for you to call our office and we will arrange testing of your urine.\par \par Please continue taking Flomax 0.4mg and Flexeril 5mg for frequency. Please call me when you need more refills.\par \par Please call my office if you have any issues with the cost or side effects of the medication. \par \par Schedule a 6 months follow up med check appointment.\par

## 2021-07-15 NOTE — HISTORY OF PRESENT ILLNESS
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 Worsening PVR, 410 cc (prevoid 518 cc)\par \par Pt saw Dr Schumacher 7/2020, s/p cysto 10/2020: shows a false passage in the urethra, likely from self cathing. 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 \par \par 12/10/2020: PVR: 10cc, pt advised that she can stop self cathing\par \par Today, patient states she is happy with Flomax 0.4 mg QHS and Flexeril 5mg BID and is noticing improvement. She stopped self cathing as advised 12/2020 and has been doing well. Denies side effects. Patient does not feel she has an infection. States that she empies the bladder very well and has no urinary complains. \par \par Patient would like to continue Flomax and Flexeril. \par

## 2021-07-15 NOTE — DISCUSSION/SUMMARY
[FreeTextEntry1] : Incomplete bladder emptying\par Patient happy with Flexeril 5 mg BID and Flomax 0.4 mg QHS.\par Will call when she needs refills\par Precautions reviewed.\par Will return in 6 months for follow up or earlier if she has any issues.\par \par \par

## 2022-01-10 ENCOUNTER — NON-APPOINTMENT (OUTPATIENT)
Age: 52
End: 2022-01-10

## 2022-01-24 ENCOUNTER — APPOINTMENT (OUTPATIENT)
Dept: UROGYNECOLOGY | Facility: CLINIC | Age: 52
End: 2022-01-24

## 2022-01-28 ENCOUNTER — RX RENEWAL (OUTPATIENT)
Age: 52
End: 2022-01-28

## 2022-03-27 ENCOUNTER — RX RENEWAL (OUTPATIENT)
Age: 52
End: 2022-03-27

## 2022-08-04 ENCOUNTER — APPOINTMENT (OUTPATIENT)
Dept: UROGYNECOLOGY | Facility: CLINIC | Age: 52
End: 2022-08-04

## 2022-09-08 ENCOUNTER — LABORATORY RESULT (OUTPATIENT)
Age: 52
End: 2022-09-08

## 2022-09-09 ENCOUNTER — APPOINTMENT (OUTPATIENT)
Dept: UROGYNECOLOGY | Facility: CLINIC | Age: 52
End: 2022-09-09

## 2022-09-09 VITALS
DIASTOLIC BLOOD PRESSURE: 83 MMHG | SYSTOLIC BLOOD PRESSURE: 148 MMHG | BODY MASS INDEX: 25.61 KG/M2 | HEIGHT: 64 IN | HEART RATE: 73 BPM | WEIGHT: 150 LBS

## 2022-09-09 DIAGNOSIS — R33.9 RETENTION OF URINE, UNSPECIFIED: ICD-10-CM

## 2022-09-09 DIAGNOSIS — Z87.898 PERSONAL HISTORY OF OTHER SPECIFIED CONDITIONS: ICD-10-CM

## 2022-09-09 PROCEDURE — 99214 OFFICE O/P EST MOD 30 MIN: CPT | Mod: 25

## 2022-09-09 PROCEDURE — 53660 DILATION OF URETHRA: CPT

## 2022-09-09 RX ORDER — SULFAMETHOXAZOLE AND TRIMETHOPRIM 800; 160 MG/1; MG/1
800-160 TABLET ORAL
Qty: 14 | Refills: 0 | Status: COMPLETED | COMMUNITY
Start: 2022-09-09 | End: 2022-09-16

## 2022-09-09 NOTE — HISTORY OF PRESENT ILLNESS
[FreeTextEntry1] : Patient is here for 1 year follow up on Incomplete Bladder Emptying (GEOVANNA)\par Last seen on 7/15/21 for med check for GEOVANNA. 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 Worsening PVR, 410 cc (prevoid 518 cc)\par \par Pt saw Dr Schumacher 7/2020, s/p cysto 10/2020: shows a false passage in the urethra, likely from self cathing. 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 \par \par 12/10/2020: PVR: 10cc, pt advised that she can stop self cathing\par \par \par Today, patient states that she is continuing to take Flomax 0.4mg QHS but she stopped Flexeril 5mg BID in July due to feeling palpitations when taking it. Palpitations resolved since stopping Flexeril. Patient does not feel she has an infection. Today pt also states that she never stopped self cathing as she previously reported that she last self cathed in 12/2020. Today pt states that she always self cathed every day all this time because she felt that she did not empty the bladder completely. During the visit pt said that she doesn't cath every day but few times a week when she feels that her bladder is still full. Pt reports that when she caths herself she feels resistance and she has to push the catheter hard and gets blood sometimes. She does not measure how much she voids and caths. \par \par \par

## 2022-09-09 NOTE — COUNSELING
[FreeTextEntry1] : If you feel like you have an infection it is important for you to call our office and we will arrange testing of your urine.\par \par Please take antibiotic Bactrim DS twice a day for 7 days to prevent an infection due to the procedure today. \par \par You will see some blood in the urine today.\par \par Please measure how much you urinate and if you feel that you're not emptying your bladder fully please catheterize yourself and measure how much came out and write these numbers down for the next 7 days. \par \par Please continue taking Flomax 0.4 mg. Refills sent to your pharmacy.\par \par Please begin taking Tizanidine 2 mg twice a day. It takes up to 6 weeks to go into full effect. Please  your refill when you complete the 1st bottle.\par \par Please call my office if you have any issues with the cost or side effects of the medication. \par \par

## 2022-09-09 NOTE — PHYSICAL EXAM
[Chaperone Present] : A chaperone was present in the examining room during all aspects of the physical examination [No Acute Distress] : in no acute distress [Well developed] : well developed [Well Nourished] : ~L well nourished [FreeTextEntry1] : Indication: GEOVANNA\par \par Urethra was prepped in sterile fashion and then a sterile non indwelling catheter (14F) was used by me to drain the bladder. Had a lot of resistance inserting the 14F catheter. Small amount of blood seen at the urethra. Catheter removed. The patient tolerated the procedure well.\par \par Void: 60 cc\par PVR: 260 cc\par \par Dr Bailon came in and performed dilation of urethra.\par \par 14 F Sterile Dilator  inserted and drained the bladder \par 16 F Sterile  Dilator inserted and removed\par 18 F Sterile Dilator inserted and removed\par \par Sterile non indwelling 14 F catheter was easily inserted and removed. \par \par Patient tolerated procedure well. \par \par

## 2022-09-09 NOTE — DISCUSSION/SUMMARY
[FreeTextEntry1] : Incomplete bladder emptying\par Continue Flomax 0.4mg QHS\par Advised to self cath for 1 week and measure (hat given to pt)\par Urine culture obtained.\par Will follow up.\par Will treat accordingly if necessary\par WIll call pt in a week to check the measurements and will decide based on that the treatment plan\par \par DSD\par Rx Tizanidine 2mg BID\par \par Urethral Stricture\par urethra dilated today in the office\par Rx Bacrtim DS BID x 7 days\par \par

## 2022-09-11 LAB
APPEARANCE: ABNORMAL
BACTERIA UR CULT: NORMAL
BILIRUBIN URINE: NEGATIVE
BLOOD URINE: ABNORMAL
COLOR: YELLOW
GLUCOSE QUALITATIVE U: NEGATIVE
KETONES URINE: NORMAL
LEUKOCYTE ESTERASE URINE: NEGATIVE
NITRITE URINE: NEGATIVE
PH URINE: 6
PROTEIN URINE: ABNORMAL
SPECIFIC GRAVITY URINE: 1.03
UROBILINOGEN URINE: NORMAL

## 2022-09-15 ENCOUNTER — NON-APPOINTMENT (OUTPATIENT)
Age: 52
End: 2022-09-15

## 2023-03-06 ENCOUNTER — RX RENEWAL (OUTPATIENT)
Age: 53
End: 2023-03-06

## 2023-03-22 ENCOUNTER — APPOINTMENT (OUTPATIENT)
Dept: UROGYNECOLOGY | Facility: CLINIC | Age: 53
End: 2023-03-22
Payer: COMMERCIAL

## 2023-03-22 VITALS
HEIGHT: 64 IN | SYSTOLIC BLOOD PRESSURE: 144 MMHG | HEART RATE: 74 BPM | DIASTOLIC BLOOD PRESSURE: 89 MMHG | WEIGHT: 156 LBS | BODY MASS INDEX: 26.63 KG/M2

## 2023-03-22 PROCEDURE — 99214 OFFICE O/P EST MOD 30 MIN: CPT | Mod: 25

## 2023-03-22 PROCEDURE — 51701 INSERT BLADDER CATHETER: CPT

## 2023-03-22 RX ORDER — CYCLOBENZAPRINE HYDROCHLORIDE 5 MG/1
5 TABLET, FILM COATED ORAL
Qty: 180 | Refills: 3 | Status: COMPLETED | COMMUNITY
Start: 2019-09-23 | End: 2023-03-22

## 2023-03-22 NOTE — HISTORY OF PRESENT ILLNESS
[FreeTextEntry1] : Patient is here for 6 months med check for incomplete bladder emptying. 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 Worsening PVR, 410 cc (prevoid 518 cc)\par \par Pt saw Dr Schumacher 7/2020, s/p cysto 10/2020: shows a false passage in the urethra, likely from self cathing. 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 \par \par 12/10/2020: PVR: 10cc, pt advised that she can stop self cathing\par 3/22/23, unable to cath pt in the office, Urethral dilation done in the office 14F>>16F>>18FF\par After dilation pt was able to urinate well at home. \par Tizanidine 2mg BID\par \par Today, patient states she is happy with Tizanidine 2 mg BID and Flomax 0.4 mg QHS is noticing improvement. Denies side effects. Feels that she's emptying her bladder well at home, only when she drinks a lot of water, she may not empty well. Pt states that she drank a lot of water today knowing that she will be coming to the office today. Pt also has some back pain and thinks that she may have a urine infection. Pt does not want to self cath anymore, she's tired of it and wants a permanent solution. \par \par \par

## 2023-03-22 NOTE — PHYSICAL EXAM
[Chaperone Present] : A chaperone was present in the examining room during all aspects of the physical examination [No Acute Distress] : in no acute distress [Well developed] : well developed [Well Nourished] : ~L well nourished [FreeTextEntry1] : Indication: Incomplete bladder emptying\par Urethra was prepped in sterile fashion and then a sterile non indwelling catheter (14F) was used by me to drain the bladder. The patient tolerated the procedure well.\par \par void: 150cc\par PVR: 470cc

## 2023-03-22 NOTE — DISCUSSION/SUMMARY
[FreeTextEntry1] : Incomplete bladder emptying\par Continue Flomax 0.4mg QHS.\par Precautions reviewed.\par \par Detrusor Sphincter dyssynergia\par Increase Rx Tizanidine 4mg BID, 30 days 1 refill\par Precautions d/w pt\par Will return in 6 weeks for PVR check\par \par Recurrent UTIs\par Urine culture obtained.\par Will follow up.\par Will treat accordingly if necessary\par \par \par

## 2023-03-22 NOTE — COUNSELING
[FreeTextEntry1] : If you feel like you have an infection it is important for you to call our office and we will arrange testing of your urine.\par \par Please continue taking Flomax 0.4mg once a day. \par \par Please STOP taking Tizanidine 2 mg.\par \par Please begin taking Tizanidine 4 mg twice a day. It takes up to 6 weeks to go into full effect. Please  your refill when you complete the 1st bottle.\par \par We will contact you with urine results.\par \par Please call my office if you have any issues with the cost or side effects of the medication. \par \par Schedule a 6 weeks follow up med check/PVR check appointment with GABE Pickering.\par

## 2023-03-26 LAB — URINE CULTURE <10: NORMAL

## 2023-03-27 ENCOUNTER — NON-APPOINTMENT (OUTPATIENT)
Age: 53
End: 2023-03-27

## 2023-05-10 ENCOUNTER — APPOINTMENT (OUTPATIENT)
Dept: UROGYNECOLOGY | Facility: CLINIC | Age: 53
End: 2023-05-10

## 2023-05-30 ENCOUNTER — RX RENEWAL (OUTPATIENT)
Age: 53
End: 2023-05-30

## 2023-07-06 ENCOUNTER — APPOINTMENT (OUTPATIENT)
Dept: UROGYNECOLOGY | Facility: CLINIC | Age: 53
End: 2023-07-06
Payer: COMMERCIAL

## 2023-07-06 VITALS
SYSTOLIC BLOOD PRESSURE: 116 MMHG | BODY MASS INDEX: 26.63 KG/M2 | DIASTOLIC BLOOD PRESSURE: 62 MMHG | HEIGHT: 64 IN | HEART RATE: 60 BPM | WEIGHT: 156 LBS

## 2023-07-06 PROCEDURE — 99214 OFFICE O/P EST MOD 30 MIN: CPT | Mod: 25

## 2023-07-06 PROCEDURE — 51701 INSERT BLADDER CATHETER: CPT

## 2023-07-06 NOTE — HISTORY OF PRESENT ILLNESS
[FreeTextEntry1] : Patient is here for 3 months med check for incomplete bladder emptying. 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 Worsening PVR, 410 cc (prevoid 518 cc)\par \par Pt saw Dr Schumacher 7/2020, s/p cysto 10/2020: shows a false passage in the urethra, likely from self cathing. 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 \par \par 12/10/2020: PVR: 10cc, pt advised that she can stop self cathing\par 9/9/22, unable to cath pt in the office, Urethral dilation done in the office 14F>>16F>>18FF\par After dilation pt was able to urinate well at home. \par Tizanidine 2mg BID, PVR in office was 470 cc\par \par Tizanidine 4 mg BID and Flomax 0.4 mg QHS\par \par Today, patient states she is happy with Tizanidine 4 mg BID and Flomax 0.4 mg QHS is noticing improvement. Does not feel that she is emptying her bladder better and also feels that when she drinks a lot, if does not empty well. 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 She denies side effects with Tizanidine 4 mg at bedtime and Flomax 0.4 mg at bedtime. She does notice that when she takes Tizanidine 4 mg in the morning (after staying up to work at night), she feels dizzy and light headed. She checks her blood pressure and notes that it is lower (110/60s) and she rests and then feels better. She has not been self cathing since her last visit as she was told not to. \par

## 2023-07-06 NOTE — COUNSELING
[FreeTextEntry1] : If you feel like you have an infection it is important for you to call our office and we will arrange testing of your urine.\par \par Please continue taking Flomax at bedtime\par \par Please STOP taking Tizanidine 4 mg in the morning.\par \par Please begin taking Tizanidine 2 mg in the morning and Tizanidine 4 mg at bedtime. It takes up to 6 weeks to go into full effect. Please  your refill when you complete the 1st bottle.\par \par We will contact you if the urine results are abnormal.\par \par Please schedule kidney sonogram. \par \par Please call my office if you have any issues with the cost or side effects of the medication. \par \par Schedule a 6-8 week follow up med check/PVR check appointment.\par

## 2023-07-06 NOTE — DISCUSSION/SUMMARY
[FreeTextEntry1] : \par Incomplete Bladder Emptying\par  cc today\par Patient notes that Tizanidine 4 mg in the morning (after working nights) makes her dizzy and her blood pressure low. Discussed trying Tizanidine 2 mg in the morning, Tizanidine 4 mg at night and Flomax at night. Alternative option is a suprapubic catheter. Advised that self cathing is not a great option as she is developing urethral strictures. Patient would like to continue with medication and if she has side effects, she may consider suprapubic catheter. Renal sono ordered. \par Urine culture obtained.\par Will follow up.\par Will treat accordingly if necessary\par \par Urethral stricture\par Slight friction with insertion of urinary catheter, + minimal blood in the cath at the end of catheterization\par Minimal amount of bleeding from the urethra after catheterization, after a few minutes, it appears to be clotting. Advised to increase fluid intake, bleeding precautions reviewed\par

## 2023-07-06 NOTE — PHYSICAL EXAM
[Chaperone Present] : A chaperone was present in the examining room during all aspects of the physical examination [No Acute Distress] : in no acute distress [Well developed] : well developed [Well Nourished] : ~L well nourished [FreeTextEntry1] : Indication: \par Urethra was prepped in sterile fashion and then a sterile non- indwelling catheter (14F) was used by me to drain the bladder. The patient tolerated the procedure well.\par void: 20 cc\par PVR: 100 cc\par

## 2023-07-10 LAB — URINE CULTURE <10: NORMAL

## 2023-07-24 NOTE — PRE-ANESTHESIA EVALUATION ADULT - NSATTENDATTESTRD_GEN_ALL_CORE
Addended by: GENNA BERG on: 7/24/2023 07:05 AM     Modules accepted: Orders     The patient has been re-examined and I agree with the above assessment or I updated with my findings.

## 2023-08-24 ENCOUNTER — APPOINTMENT (OUTPATIENT)
Dept: UROGYNECOLOGY | Facility: CLINIC | Age: 53
End: 2023-08-24

## 2023-10-12 ENCOUNTER — RX RENEWAL (OUTPATIENT)
Age: 53
End: 2023-10-12

## 2023-10-23 ENCOUNTER — RX RENEWAL (OUTPATIENT)
Age: 53
End: 2023-10-23

## 2023-11-29 ENCOUNTER — APPOINTMENT (OUTPATIENT)
Dept: UROGYNECOLOGY | Facility: CLINIC | Age: 53
End: 2023-11-29
Payer: COMMERCIAL

## 2023-11-29 VITALS
HEART RATE: 86 BPM | HEIGHT: 64 IN | BODY MASS INDEX: 26.63 KG/M2 | DIASTOLIC BLOOD PRESSURE: 74 MMHG | SYSTOLIC BLOOD PRESSURE: 149 MMHG | WEIGHT: 156 LBS

## 2023-11-29 DIAGNOSIS — N36.44 MUSCULAR DISORDERS OF URETHRA: ICD-10-CM

## 2023-11-29 DIAGNOSIS — N39.0 URINARY TRACT INFECTION, SITE NOT SPECIFIED: ICD-10-CM

## 2023-11-29 PROCEDURE — 51701 INSERT BLADDER CATHETER: CPT

## 2023-11-29 PROCEDURE — 99214 OFFICE O/P EST MOD 30 MIN: CPT | Mod: 25

## 2023-11-29 RX ORDER — TIZANIDINE 2 MG/1
2 TABLET ORAL
Qty: 30 | Refills: 1 | Status: DISCONTINUED | COMMUNITY
Start: 2023-07-06 | End: 2023-11-29

## 2023-12-03 LAB — URINE CULTURE <10: NORMAL

## 2024-01-10 ENCOUNTER — APPOINTMENT (OUTPATIENT)
Dept: UROGYNECOLOGY | Facility: CLINIC | Age: 54
End: 2024-01-10

## 2024-02-28 ENCOUNTER — APPOINTMENT (OUTPATIENT)
Dept: UROGYNECOLOGY | Facility: CLINIC | Age: 54
End: 2024-02-28

## 2024-03-06 ENCOUNTER — APPOINTMENT (OUTPATIENT)
Dept: UROGYNECOLOGY | Facility: CLINIC | Age: 54
End: 2024-03-06
Payer: COMMERCIAL

## 2024-03-06 VITALS
BODY MASS INDEX: 28 KG/M2 | HEART RATE: 70 BPM | DIASTOLIC BLOOD PRESSURE: 71 MMHG | SYSTOLIC BLOOD PRESSURE: 131 MMHG | WEIGHT: 164 LBS | HEIGHT: 64 IN

## 2024-03-06 DIAGNOSIS — R33.9 RETENTION OF URINE, UNSPECIFIED: ICD-10-CM

## 2024-03-06 PROCEDURE — 99214 OFFICE O/P EST MOD 30 MIN: CPT | Mod: 25

## 2024-03-06 PROCEDURE — 51701 INSERT BLADDER CATHETER: CPT

## 2024-03-06 RX ORDER — TIZANIDINE 4 MG/1
4 TABLET ORAL TWICE DAILY
Qty: 180 | Refills: 1 | Status: ACTIVE | COMMUNITY
Start: 2022-09-09 | End: 1900-01-01

## 2024-03-06 NOTE — REASON FOR VISIT
[TextEntry] : Reason for visit: Med check PVR Voids per day:   4 Voids per night:   4 Urge incontinence No Stress incontinence: No Constipation: No Fecal incontinence: No Vaginal bulge: No

## 2024-03-06 NOTE — END OF VISIT
[TextEntry] :  I, Claribel Whatley PA-C, spent 30 minutes face to face with the patient. This excludes cath

## 2024-03-06 NOTE — DISCUSSION/SUMMARY
[FreeTextEntry1] :  Incomplete Bladder Emptying PVR WNL today  Continue Flomax 0.4 mg and Tizanidine 4 mg BID, advised to take second dose of Tizanidine right before bed instead of a few hours before bed to minimize side effects. If patient continues to experience side effects, advised to call the office so that we can adjust dosing.  Urine culture obtained. Will follow up. Will treat accordingly if necessary Kidney stone precautions reviewed  Advised to follow up with urology regarding bladder stone

## 2024-03-06 NOTE — PHYSICAL EXAM
[Chaperone Present] : A chaperone was present in the examining room during all aspects of the physical examination [No Acute Distress] : in no acute distress [Well developed] : well developed [Well Nourished] : ~L well nourished [FreeTextEntry1] : Indication: Incomplete bladder emptying Urethra was prepped in sterile fashion and then a sterile non- indwelling catheter (14F) was used by me to drain the bladder. The patient tolerated the procedure well. void: 55 cc PVR: 50 cc  + minimal R CVA tenderness, no L CVA tenderness on exam

## 2024-03-06 NOTE — HISTORY OF PRESENT ILLNESS
[FreeTextEntry1] : Patient is here for 3 months follow up med check for Incomplete bladder emptying, DSD. 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 Worsening PVR, 410 cc (prevoid 518 cc)  Pt saw Dr Schumacher 7/2020, s/p cysto 10/2020: shows a false passage in the urethra, likely from self cathing. 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  12/10/2020: PVR: 10cc, pt advised that she can stop self cathing 9/9/22, unable to cath pt in the office, Urethral dilation done in the office 14F>>16F>>18FF  After dilation pt was able to urinate well at home. Tizanidine 2mg BID, PVR in office was 470 cc  s/p Tizanidine 4 mg BID: pt was dizzy in the morning with low BPs, pt was working night shifts Flomax 0.4 mg QHS  Tizanidine 2mg QAM and 4mg QHS--elevated PVR last visit Tizanidine 4 mg BID  12/8/2023 US kidneys and bladder- no hydronephrosis, new 1.7 cm bladder stone  Patient is now working day [id number] pm. Notes that the morning dosage of Tizanidine is fine, but she experiences side effects when taking the second dose of Tizanidine after coming home from work. She takes it after coming home, then takes care of things around the house for awhile before going to bed. Notes that she feels tired, dizzy and has a low BP. She feels that she is emptying her bladder well. Has an appointment with Dr. Sanon in April for the bladder stone.  She is also noticed some right lower back pain for the past few days, on and off. Denies UTI symptoms.

## 2024-03-06 NOTE — COUNSELING
[FreeTextEntry1] : If you feel like you have an infection it is important for you to call our office and we will arrange testing of your urine.  Please continue taking Tizanidine 4 mg twice daily. Take the second dose right before bedtime since it can make you sleepy.  If the side effects are bothersome, please call the office so that we can discuss changing the medication.  Please continue Flomax at bedtime.  We will contact you if the urine results are abnormal.  If the back pain continues or worsens or you start to experience pain with urination or blood in the urine, please seek urgent medical care.  Please call my office if you have any issues with the cost or side effects of the medication.   Schedule a 6 months follow up med check appointment.

## 2024-03-10 LAB — URINE CULTURE <10: NORMAL

## 2024-04-02 ENCOUNTER — APPOINTMENT (OUTPATIENT)
Dept: UROLOGY | Facility: CLINIC | Age: 54
End: 2024-04-02
Payer: COMMERCIAL

## 2024-04-02 VITALS
WEIGHT: 164 LBS | DIASTOLIC BLOOD PRESSURE: 86 MMHG | HEART RATE: 71 BPM | BODY MASS INDEX: 28 KG/M2 | HEIGHT: 64 IN | SYSTOLIC BLOOD PRESSURE: 155 MMHG

## 2024-04-02 DIAGNOSIS — N36.5 URETHRAL FALSE PASSAGE: ICD-10-CM

## 2024-04-02 PROCEDURE — 99214 OFFICE O/P EST MOD 30 MIN: CPT

## 2024-04-02 PROCEDURE — G2211 COMPLEX E/M VISIT ADD ON: CPT

## 2024-04-02 NOTE — HISTORY OF PRESENT ILLNESS
[FreeTextEntry1] : DEZ GAMEZ is a 53-year-old female hx of incomplete bladder emptying previously on CIC now voiding, possible DSD with no neurologic correlated (?PFD), managed medically and seeing urogynecology (previously urology) presents for evaluation of bladder stone.  Currently seeing urogynecology and she is taking Flomax and Tizanidine. Last PVR in office was normal though previous PVR was up to 400 ccs sp urodynamics 2017 showing obstructive voiding with possible DSD, neuro evaluation negative. sp cystoscopy with dr. Schumacher 2020 showing false passage in urethra, trabeculated bladder with diverticulum sp urethral dilation in office 2022 as pt was unable to catharize.  Pt previously was self-catharizing when she felt incomplete bladder emptying and stopped last year as she had improvement with medication. Currently she is content with voiding and denies frequency or urgency. Denies flank pain, gross hematuria, dysuria or associated symptoms.  Bedside US - difficult to visualize bladder stone  Initial PVR today - 340 cc. Second PVR - 102 cc.  RBUS 12/08/2023 - images independently reviewed by me -agree with findings bladder stone seen IMPRESSION: No hydronephrosis or shadowing renal stone. There is a new 1.7 cm bladder stone. Additionally, the bladder wall appears mildly thickened/trabeculated, as can be seen with bladder dysfunction and/or cystitis.  UCx 03/2024 - negative    history: Denies previous kidney stones, recurrent UTIs.  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 Social History: endo PCA at [employer]

## 2024-04-02 NOTE — ADDENDUM
[FreeTextEntry1] : Patient's note was transcribed with the assistance of a medical scribe under the supervision of Dr. Sanon. I, Dr. Sanon, have reviewed the patient's chart and agree that it aligns with my medical decisions. Franca Dumont, our scribe, also served as a chaperone for physical examination purposes.

## 2024-04-02 NOTE — ASSESSMENT
[FreeTextEntry1] : DEZ GAMEZ is a 53-year-old female hx of incomplete bladder emptying previously on CIC now voiding, possible DSD with no neurologic correlated (?PFD), managed medically and seeing urogynecology (previously urology) presents for evaluation of bladder stone.  Cystoscopy 2020 with Dr. Jordan showed false passage in the urethra.  Today she had adequate bladder emptying although this was on the second void. I suspect she has a bladder stone due to years of pelvic floor dysfunction and chronic incomplete bladder emptying. We discussed options such as cystolitholapaxy which is what I recommended and patient is interested in this.  At first I recommend that we have confirmation that this is a bladder stone as opposed to a ureteral stone at the ureterovesical junction.  Options include CT scan versus cystoscopy and I am favoring cystoscopy to rule out other abnormality such as urethral stricture which has less radiation to risk as well.  Patient is agreeable  Plan - f/u 3-4 weeks, cystoscopy to visualize bladder stone  She will likely book laser cystolitholapaxy at this visit  Urinalysis urine culture Continue urogynecology follow-up, recommend continued double voiding -Cystoscopy, discussed this endoscopic procedure with the patient and the associated risks

## 2024-04-07 LAB
BACTERIA UR CULT: NORMAL
BILIRUB UR QL STRIP: NORMAL
COLLECTION METHOD: NORMAL
GLUCOSE UR-MCNC: NORMAL
HCG UR QL: 0.2 EU/DL
HGB UR QL STRIP.AUTO: NORMAL
KETONES UR-MCNC: NORMAL
LEUKOCYTE ESTERASE UR QL STRIP: NORMAL
NITRITE UR QL STRIP: NORMAL
PH UR STRIP: 6.5
PROT UR STRIP-MCNC: NORMAL
SP GR UR STRIP: 1.02

## 2024-05-07 ENCOUNTER — APPOINTMENT (OUTPATIENT)
Dept: UROLOGY | Facility: CLINIC | Age: 54
End: 2024-05-07
Payer: COMMERCIAL

## 2024-05-07 DIAGNOSIS — N35.919 UNSPECIFIED URETHRAL STRICTURE, MALE, UNSPECIFIED SITE: ICD-10-CM

## 2024-05-07 DIAGNOSIS — N21.0 CALCULUS IN BLADDER: ICD-10-CM

## 2024-05-07 PROCEDURE — 99214 OFFICE O/P EST MOD 30 MIN: CPT | Mod: 25

## 2024-05-07 PROCEDURE — 52000 CYSTOURETHROSCOPY: CPT

## 2024-05-07 NOTE — ADDENDUM
[FreeTextEntry1] : Patient's note was transcribed with the assistance of a medical scribe under the supervision of Dr. Sanon. I, Dr. Sanon, have reviewed the patient's chart and agree that it aligns with my medical decisions. Franca Dumont, our scribe, also served as a chaperone for physical examination purposes.  The submitted E/M billing level for this visit reflects the total time spent on the day of the visit including face-to-face time spent with the patient, non-face-to-face review of medical records and relevant information, documentation, and asynchronous communication with the patient after a visit via phone, email, or patients EHR portal after the visit.  The medical records reviewed are either scanned into the chart or reviewed with the patient using a patients electronic medical records portal for patients with records not available to [facility] via electronic transmission platforms from other institutions and labs.  Time spend counseling and performing coordination of care was also included in determining the appropriate EM billing level.

## 2024-05-07 NOTE — HISTORY OF PRESENT ILLNESS
[FreeTextEntry1] : DEZ GAMEZ is a 53-year-old female hx of incomplete bladder emptying previously on CIC now voiding, possible DSD with no neurologic correlated (?PFD), managed medically and seeing urogynecology (previously urology) presents for evaluation of bladder stone.  Cystoscopy 2020 with Dr. Jordan showed false passage in the urethra.  Cysto with approx 2.5 cm bladder stone seen calcium oxalate appearing, diffuse erythema of an cystitis cystica with trabeculations and small bladder diverticula. Urethra with right-sided fibrosis likely false passage and/or stricture  Pt presents today for a cystoscopy. Denies flank pain, gross hematuria, dysuria or associated symptoms.  She reports persistent obstructive and irritative voiding symptoms  UA 04/02/2024 - negative UCx - normal urogenital gene   previously Currently seeing urogynecology and she is taking Flomax and Tizanidine. Last PVR in office was normal though previous PVR was up to 400 ccs sp urodynamics 2017 showing obstructive voiding with possible DSD, neuro evaluation negative. sp cystoscopy with dr. Schumacher 2020 showing false passage in urethra, trabeculated bladder with diverticulum sp urethral dilation in office 2022 as pt was unable to catharize.  Pt previously was self-catharizing when she felt incomplete bladder emptying and stopped last year as she had improvement with medication. Currently she is content with voiding and denies frequency or urgency. Denies flank pain, gross hematuria, dysuria or associated symptoms.  Bedside US - difficult to visualize bladder stone  Initial PVR today - 340 cc. Second PVR - 102 cc.  RBUS 12/08/2023 - images independently reviewed by me -agree with findings bladder stone seen IMPRESSION: No hydronephrosis or shadowing renal stone. There is a new 1.7 cm bladder stone. Additionally, the bladder wall appears mildly thickened/trabeculated, as can be seen with bladder dysfunction and/or cystitis.  UCx 03/2024 - negative    history: Denies previous kidney stones, recurrent UTIs.  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

## 2024-05-07 NOTE — ASSESSMENT
[FreeTextEntry1] : DEZ GAMEZ is a 53-year-old female hx of incomplete bladder emptying previously on CIC now voiding, possible DSD with no neurologic correlated (?PFD), managed medically and seeing urogynecology (previously urology) presents for evaluation of bladder stone.  Cystoscopy 2020 with Dr. Jordan showed false passage in the urethra.  Cysto with approx 2.5 cm bladder stone seen calcium oxalate appearing, diffuse erythema of an cystitis cystica with trabeculations and small bladder diverticula. Urethra with right-sided fibrosis likely false passage and/or stricture  Plan - plan for laser cystolitholapaxy, cystoscopy bladder biopsy fulguration, urethral dilation  I suspect she has a bladder stone due to years of pelvic floor dysfunction and chronic incomplete bladder emptying and/or urethral stricture. Once the procedure is done she will follow-up with Dr. Cowan for long-term management of urethral stricture and the above-issues  Risk of infection bleeding injury to the urethra scar tissue formation and need for catheter was discussed

## 2024-05-13 ENCOUNTER — NON-APPOINTMENT (OUTPATIENT)
Age: 54
End: 2024-05-13

## 2024-05-13 LAB
BILIRUB UR QL STRIP: NORMAL
COLLECTION METHOD: NORMAL
GLUCOSE UR-MCNC: NORMAL
HCG UR QL: 0.2 EU/DL
HGB UR QL STRIP.AUTO: NORMAL
KETONES UR-MCNC: NORMAL
LEUKOCYTE ESTERASE UR QL STRIP: NORMAL
NITRITE UR QL STRIP: NORMAL
PH UR STRIP: 6.5
PROT UR STRIP-MCNC: NORMAL
SP GR UR STRIP: 1.01

## 2024-05-22 RX ORDER — TAMSULOSIN HYDROCHLORIDE 0.4 MG/1
0.4 CAPSULE ORAL
Qty: 90 | Refills: 1 | Status: ACTIVE | COMMUNITY
Start: 2020-11-20 | End: 1900-01-01

## 2024-06-07 ENCOUNTER — OUTPATIENT (OUTPATIENT)
Dept: OUTPATIENT SERVICES | Facility: HOSPITAL | Age: 54
LOS: 1 days | End: 2024-06-07
Payer: COMMERCIAL

## 2024-06-07 VITALS
TEMPERATURE: 98 F | SYSTOLIC BLOOD PRESSURE: 146 MMHG | DIASTOLIC BLOOD PRESSURE: 93 MMHG | WEIGHT: 165.35 LBS | HEART RATE: 70 BPM | RESPIRATION RATE: 18 BRPM | HEIGHT: 65 IN | OXYGEN SATURATION: 100 %

## 2024-06-07 DIAGNOSIS — Z01.818 ENCOUNTER FOR OTHER PREPROCEDURAL EXAMINATION: ICD-10-CM

## 2024-06-07 DIAGNOSIS — N21.0 CALCULUS IN BLADDER: ICD-10-CM

## 2024-06-07 DIAGNOSIS — Z98.891 HISTORY OF UTERINE SCAR FROM PREVIOUS SURGERY: Chronic | ICD-10-CM

## 2024-06-07 LAB
ALBUMIN SERPL ELPH-MCNC: 4.3 G/DL — SIGNIFICANT CHANGE UP (ref 3.5–5.2)
ALP SERPL-CCNC: 92 U/L — SIGNIFICANT CHANGE UP (ref 30–115)
ALT FLD-CCNC: 12 U/L — SIGNIFICANT CHANGE UP (ref 0–41)
ANION GAP SERPL CALC-SCNC: 12 MMOL/L — SIGNIFICANT CHANGE UP (ref 7–14)
APPEARANCE UR: CLEAR — SIGNIFICANT CHANGE UP
AST SERPL-CCNC: 17 U/L — SIGNIFICANT CHANGE UP (ref 0–41)
BASOPHILS # BLD AUTO: 0.02 K/UL — SIGNIFICANT CHANGE UP (ref 0–0.2)
BASOPHILS NFR BLD AUTO: 0.3 % — SIGNIFICANT CHANGE UP (ref 0–1)
BILIRUB SERPL-MCNC: 0.3 MG/DL — SIGNIFICANT CHANGE UP (ref 0.2–1.2)
BILIRUB UR-MCNC: NEGATIVE — SIGNIFICANT CHANGE UP
BUN SERPL-MCNC: 14 MG/DL — SIGNIFICANT CHANGE UP (ref 10–20)
CALCIUM SERPL-MCNC: 9 MG/DL — SIGNIFICANT CHANGE UP (ref 8.4–10.5)
CHLORIDE SERPL-SCNC: 106 MMOL/L — SIGNIFICANT CHANGE UP (ref 98–110)
CO2 SERPL-SCNC: 20 MMOL/L — SIGNIFICANT CHANGE UP (ref 17–32)
COLOR SPEC: YELLOW — SIGNIFICANT CHANGE UP
CREAT SERPL-MCNC: 0.7 MG/DL — SIGNIFICANT CHANGE UP (ref 0.7–1.5)
DIFF PNL FLD: NEGATIVE — SIGNIFICANT CHANGE UP
EGFR: 104 ML/MIN/1.73M2 — SIGNIFICANT CHANGE UP
EOSINOPHIL # BLD AUTO: 0.18 K/UL — SIGNIFICANT CHANGE UP (ref 0–0.7)
EOSINOPHIL NFR BLD AUTO: 2.9 % — SIGNIFICANT CHANGE UP (ref 0–8)
GLUCOSE SERPL-MCNC: 72 MG/DL — SIGNIFICANT CHANGE UP (ref 70–99)
GLUCOSE UR QL: NEGATIVE MG/DL — SIGNIFICANT CHANGE UP
HCT VFR BLD CALC: 44 % — SIGNIFICANT CHANGE UP (ref 37–47)
HGB BLD-MCNC: 14.9 G/DL — SIGNIFICANT CHANGE UP (ref 12–16)
IMM GRANULOCYTES NFR BLD AUTO: 0.2 % — SIGNIFICANT CHANGE UP (ref 0.1–0.3)
KETONES UR-MCNC: NEGATIVE MG/DL — SIGNIFICANT CHANGE UP
LEUKOCYTE ESTERASE UR-ACNC: ABNORMAL
LYMPHOCYTES # BLD AUTO: 2.69 K/UL — SIGNIFICANT CHANGE UP (ref 1.2–3.4)
LYMPHOCYTES # BLD AUTO: 43 % — SIGNIFICANT CHANGE UP (ref 20.5–51.1)
MCHC RBC-ENTMCNC: 29.7 PG — SIGNIFICANT CHANGE UP (ref 27–31)
MCHC RBC-ENTMCNC: 33.9 G/DL — SIGNIFICANT CHANGE UP (ref 32–37)
MCV RBC AUTO: 87.8 FL — SIGNIFICANT CHANGE UP (ref 81–99)
MONOCYTES # BLD AUTO: 0.45 K/UL — SIGNIFICANT CHANGE UP (ref 0.1–0.6)
MONOCYTES NFR BLD AUTO: 7.2 % — SIGNIFICANT CHANGE UP (ref 1.7–9.3)
NEUTROPHILS # BLD AUTO: 2.91 K/UL — SIGNIFICANT CHANGE UP (ref 1.4–6.5)
NEUTROPHILS NFR BLD AUTO: 46.4 % — SIGNIFICANT CHANGE UP (ref 42.2–75.2)
NITRITE UR-MCNC: NEGATIVE — SIGNIFICANT CHANGE UP
NRBC # BLD: 0 /100 WBCS — SIGNIFICANT CHANGE UP (ref 0–0)
PH UR: 6 — SIGNIFICANT CHANGE UP (ref 5–8)
PLATELET # BLD AUTO: 244 K/UL — SIGNIFICANT CHANGE UP (ref 130–400)
PMV BLD: 10.4 FL — SIGNIFICANT CHANGE UP (ref 7.4–10.4)
POTASSIUM SERPL-MCNC: 3.7 MMOL/L — SIGNIFICANT CHANGE UP (ref 3.5–5)
POTASSIUM SERPL-SCNC: 3.7 MMOL/L — SIGNIFICANT CHANGE UP (ref 3.5–5)
PROT SERPL-MCNC: 7.3 G/DL — SIGNIFICANT CHANGE UP (ref 6–8)
PROT UR-MCNC: NEGATIVE MG/DL — SIGNIFICANT CHANGE UP
RBC # BLD: 5.01 M/UL — SIGNIFICANT CHANGE UP (ref 4.2–5.4)
RBC # FLD: 14.6 % — HIGH (ref 11.5–14.5)
SODIUM SERPL-SCNC: 138 MMOL/L — SIGNIFICANT CHANGE UP (ref 135–146)
SP GR SPEC: 1.01 — SIGNIFICANT CHANGE UP (ref 1–1.03)
UROBILINOGEN FLD QL: 0.2 MG/DL — SIGNIFICANT CHANGE UP (ref 0.2–1)
WBC # BLD: 6.26 K/UL — SIGNIFICANT CHANGE UP (ref 4.8–10.8)
WBC # FLD AUTO: 6.26 K/UL — SIGNIFICANT CHANGE UP (ref 4.8–10.8)

## 2024-06-07 PROCEDURE — 80053 COMPREHEN METABOLIC PANEL: CPT

## 2024-06-07 PROCEDURE — 85025 COMPLETE CBC W/AUTO DIFF WBC: CPT

## 2024-06-07 PROCEDURE — 87086 URINE CULTURE/COLONY COUNT: CPT

## 2024-06-07 PROCEDURE — 36415 COLL VENOUS BLD VENIPUNCTURE: CPT

## 2024-06-07 PROCEDURE — 93005 ELECTROCARDIOGRAM TRACING: CPT

## 2024-06-07 PROCEDURE — 93010 ELECTROCARDIOGRAM REPORT: CPT

## 2024-06-07 PROCEDURE — 81001 URINALYSIS AUTO W/SCOPE: CPT

## 2024-06-07 PROCEDURE — 99214 OFFICE O/P EST MOD 30 MIN: CPT | Mod: 25

## 2024-06-07 NOTE — H&P PST ADULT - HISTORY OF PRESENT ILLNESS
Patient is a 53 year old female presenting to PAST in preparation for  Cystoscopy, laser cystolitholapaxy, bladder biopsy, fulguration, urethral dilation  on 6/28 under gen anesthesia by Dr. ugarte  Reports h/o bladder stone was advised to have above, presently denies pain  PATIENT CURRENTLY DENIES CHEST PAIN  SHORTNESS OF BREATH  PALPITATIONS,  DYSURIA, OR UPPER RESPIRATORY INFECTION IN PAST 2 WEEKS    Anesthesia Alert  NO--Difficult Airway  NO--History of neck surgery or radiation  NO--Limited ROM of neck  NO--History of Malignant hyperthermia  NO--Personal or family history of Pseudocholinesterase deficiency  NO--Prior Anesthesia Complication  NO--Latex Allergy  NO--Loose teeth  NO--History of Rheumatoid Arthritis  NO--MARIA EUGENIA  NO-- BLEEDING RISK  NO--Other_____      D[employer] Activity Status Index (DASI) from [employer]  on 6/7/2024      RESULT SUMMARY:  52.95 points  The higher the score (maximum 58.2), the higher the functional status.    9.25 METs        INPUTS:  Take care of self —> 2.75 = Yes  Walk indoors —> 1.75 = Yes  Walk 1&ndash;2 blocks on level ground —> 2.75 = Yes  Climb a flight of stairs or walk up a hill —> 5.5 = Yes  Run a short distance —> 8 = Yes  Do light work around the house —> 2.7 = Yes  Do moderate work around the house —> 3.5 = Yes  Do heavy work around the house —> 8 = Yes  Do yardwork —> 4.5 = Yes  Have sexual relations —> 0 = No  Participate in moderate recreational activities —> 6 = Yes  Participate in strenuous sports —> 7.5 = Yes      Revised Cardiac Risk Index for Pre-Operative Risk from [employer]  on 6/7/2024      RESULT SUMMARY:  0 points  Class I Risk    3.9 %  30-day risk of death, MI, or cardiac arrest    From Duceppe 2017. These numbers are higher than those from the original study (Umer 1999). See Evidence for details.      INPUTS:  Elevated-risk surgery —> 0 = No  History of ischemic heart disease —> 0 = No  History of congestive heart failure —> 0 = No  History of cerebrovascular disease —> 0 = No  Pre-operative treatment with insulin —> 0 = No  Pre-operative creatinine >2 mg/dL / 176.8 µmol/L —> 0 = No      As per patient, this is their complete medical and surgical history, including medications both prescribed or over the counter.  Patient verbalized understanding of instructions and was given the opportunity to ask questions and have them answered.

## 2024-06-08 DIAGNOSIS — Z01.818 ENCOUNTER FOR OTHER PREPROCEDURAL EXAMINATION: ICD-10-CM

## 2024-06-08 DIAGNOSIS — N21.0 CALCULUS IN BLADDER: ICD-10-CM

## 2024-06-08 LAB
CULTURE RESULTS: SIGNIFICANT CHANGE UP
SPECIMEN SOURCE: SIGNIFICANT CHANGE UP

## 2024-06-28 ENCOUNTER — APPOINTMENT (OUTPATIENT)
Dept: UROLOGY | Facility: HOSPITAL | Age: 54
End: 2024-06-28

## 2024-06-28 ENCOUNTER — OUTPATIENT (OUTPATIENT)
Dept: OUTPATIENT SERVICES | Facility: HOSPITAL | Age: 54
LOS: 1 days | Discharge: HOME | End: 2024-06-28
Payer: COMMERCIAL

## 2024-06-28 ENCOUNTER — TRANSCRIPTION ENCOUNTER (OUTPATIENT)
Age: 54
End: 2024-06-28

## 2024-06-28 VITALS
RESPIRATION RATE: 18 BRPM | HEIGHT: 64 IN | TEMPERATURE: 98 F | SYSTOLIC BLOOD PRESSURE: 137 MMHG | HEART RATE: 59 BPM | OXYGEN SATURATION: 100 % | DIASTOLIC BLOOD PRESSURE: 86 MMHG | WEIGHT: 164.91 LBS

## 2024-06-28 VITALS
HEART RATE: 64 BPM | SYSTOLIC BLOOD PRESSURE: 134 MMHG | DIASTOLIC BLOOD PRESSURE: 76 MMHG | OXYGEN SATURATION: 99 % | RESPIRATION RATE: 16 BRPM

## 2024-06-28 DIAGNOSIS — Z98.891 HISTORY OF UTERINE SCAR FROM PREVIOUS SURGERY: Chronic | ICD-10-CM

## 2024-06-28 DIAGNOSIS — N21.0 CALCULUS IN BLADDER: ICD-10-CM

## 2024-06-28 PROCEDURE — 52318 REMOVE BLADDER STONE: CPT

## 2024-06-28 PROCEDURE — 52234 CYSTOSCOPY AND TREATMENT: CPT

## 2024-06-28 PROCEDURE — 88305 TISSUE EXAM BY PATHOLOGIST: CPT | Mod: 26

## 2024-06-28 PROCEDURE — 72170 X-RAY EXAM OF PELVIS: CPT

## 2024-06-28 PROCEDURE — 82365 CALCULUS SPECTROSCOPY: CPT

## 2024-06-28 PROCEDURE — C1889: CPT

## 2024-06-28 PROCEDURE — 88305 TISSUE EXAM BY PATHOLOGIST: CPT

## 2024-06-28 RX ORDER — OXYCODONE AND ACETAMINOPHEN 5; 325 MG/1; MG/1
1 TABLET ORAL ONCE
Refills: 0 | Status: DISCONTINUED | OUTPATIENT
Start: 2024-06-28 | End: 2024-06-28

## 2024-06-28 RX ORDER — PHENAZOPYRIDINE HCL 200 MG/1
100 TABLET ORAL ONCE
Refills: 0 | Status: COMPLETED | OUTPATIENT
Start: 2024-06-28 | End: 2024-06-28

## 2024-06-28 RX ORDER — TAMSULOSIN HYDROCHLORIDE 0.4 MG/1
1 CAPSULE ORAL
Refills: 0 | DISCHARGE

## 2024-06-28 RX ORDER — HYDROMORPHONE HCL 0.2 MG/ML
0.5 INJECTION, SOLUTION INTRAVENOUS
Refills: 0 | Status: DISCONTINUED | OUTPATIENT
Start: 2024-06-28 | End: 2024-06-28

## 2024-06-28 RX ORDER — TIZANIDINE 4 MG/1
2 TABLET ORAL
Refills: 0 | DISCHARGE

## 2024-06-28 RX ORDER — SULFAMETHOXAZOLE AND TRIMETHOPRIM 800; 160 MG/1; MG/1
1 TABLET ORAL
Qty: 6 | Refills: 0
Start: 2024-06-28 | End: 2024-06-30

## 2024-06-28 RX ORDER — MORPHINE SULFATE 100 MG/1
2 TABLET, EXTENDED RELEASE ORAL ONCE
Refills: 0 | Status: DISCONTINUED | OUTPATIENT
Start: 2024-06-28 | End: 2024-06-28

## 2024-06-28 RX ORDER — DEXTROSE MONOHYDRATE AND SODIUM CHLORIDE 5; .3 G/100ML; G/100ML
1000 INJECTION, SOLUTION INTRAVENOUS
Refills: 0 | Status: DISCONTINUED | OUTPATIENT
Start: 2024-06-28 | End: 2024-06-28

## 2024-06-28 RX ORDER — ONDANSETRON HYDROCHLORIDE 2 MG/ML
4 INJECTION INTRAMUSCULAR; INTRAVENOUS ONCE
Refills: 0 | Status: DISCONTINUED | OUTPATIENT
Start: 2024-06-28 | End: 2024-06-28

## 2024-06-28 RX ORDER — AMLODIPINE BESYLATE 2.5 MG/1
1 TABLET ORAL
Refills: 0 | DISCHARGE

## 2024-06-28 RX ADMIN — DEXTROSE MONOHYDRATE AND SODIUM CHLORIDE 75 MILLILITER(S): 5; .3 INJECTION, SOLUTION INTRAVENOUS at 22:25

## 2024-06-28 RX ADMIN — PHENAZOPYRIDINE HCL 100 MILLIGRAM(S): 200 TABLET ORAL at 21:45

## 2024-07-01 ENCOUNTER — NON-APPOINTMENT (OUTPATIENT)
Age: 54
End: 2024-07-01

## 2024-07-01 PROBLEM — N21.0 CALCULUS IN BLADDER: Chronic | Status: ACTIVE | Noted: 2024-06-07

## 2024-07-02 LAB — SURGICAL PATHOLOGY STUDY: SIGNIFICANT CHANGE UP

## 2024-07-04 DIAGNOSIS — N21.0 CALCULUS IN BLADDER: ICD-10-CM

## 2024-07-04 DIAGNOSIS — N30.90 CYSTITIS, UNSPECIFIED WITHOUT HEMATURIA: ICD-10-CM

## 2024-07-04 DIAGNOSIS — N32.89 OTHER SPECIFIED DISORDERS OF BLADDER: ICD-10-CM

## 2024-07-04 DIAGNOSIS — N32.9 BLADDER DISORDER, UNSPECIFIED: ICD-10-CM

## 2024-07-08 LAB
CELL MATERIAL STONE EST-MCNT: SIGNIFICANT CHANGE UP
LABORATORY COMMENT REPORT: SIGNIFICANT CHANGE UP
NIDUS STONE QN: SIGNIFICANT CHANGE UP

## 2024-07-15 ENCOUNTER — APPOINTMENT (OUTPATIENT)
Dept: UROLOGY | Facility: CLINIC | Age: 54
End: 2024-07-15

## 2024-08-05 ENCOUNTER — APPOINTMENT (OUTPATIENT)
Dept: UROLOGY | Facility: CLINIC | Age: 54
End: 2024-08-05

## 2024-08-05 PROCEDURE — 99214 OFFICE O/P EST MOD 30 MIN: CPT

## 2024-08-05 NOTE — ADDENDUM
[FreeTextEntry1] : Patient's note was transcribed with the assistance of a medical scribe under the supervision of Dr. Sanon. I, Dr. Sanon, have reviewed the patient's chart and agree that it aligns with my medical decisions. Franca Dumont, our scribe, also served as a chaperone for physical examination purposes.  The submitted E/M billing level for this visit reflects the total time spent on the day of the visit including face-to-face time spent with the patient, non-face-to-face review of medical records and relevant information, documentation, and asynchronous communication with the patient after a visit via phone, email, or patients EHR portal after the visit.  The medical records reviewed are either scanned into the chart or reviewed with the patient using a patients electronic medical records portal for patients with records not available to [facility] via electronic transmission platforms from other institutions and labs.  Time spend counseling and performing coordination of care was also included in determining the appropriate EM billing level.

## 2024-08-05 NOTE — ASSESSMENT
[FreeTextEntry1] : DEZ GAMEZ is a 53-year-old female hx of incomplete bladder emptying previously on CIC now voiding, possible DSD with no neurologic correlated (?PFD), managed medically and seeing urogynecology (previously urology) presents for evaluation of bladder stone.  Cystoscopy 2020 with Dr. Jordan showed false passage in the urethra sp cystolitholapaxy, bladder biopsy fulguration performed of posterior bladder wall and bladder tic. 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   As prior-- I suspect she has a bladder stone due to years of pelvic floor dysfunction and chronic incomplete bladder emptying and/or urethral stricture.  Patient will continue to follow-up with urogynecology and if she has any worsening symptoms in the future, hematuria stones etc. she will return. cont to monitor pvrs

## 2024-08-05 NOTE — HISTORY OF PRESENT ILLNESS
[FreeTextEntry1] : DEZ GAMEZ is a 53-year-old female hx of incomplete bladder emptying previously on CIC now voiding, possible DSD with no neurologic correlated (?PFD), managed medically and seeing urogynecology (previously urology) presents for evaluation of bladder stone.  Cystoscopy 2020 with Dr. Jordan showed false passage in the urethra sp cystolitholapaxy, bladder biopsy fulguration performed of posterior bladder wall and bladder tic. 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   Pt reports good flow and improved symptoms post bladder stone removal. Denies flank pain, gross hematuria, dysuria or associated symptoms.   PVR today shows 73 cc  Pathology 06/28/2024 -  1. Bladder diverticulum, biopsy: - Benign urothelial mucosa with focal non-keratinizing squamous metaplasia and acute and chronic inflammation  2. Posterior bladder, biopsy: - Benign urothelial mucosa with focal marked chronic inflammation  previously Cysto with approx 2.5 cm bladder stone seen calcium oxalate appearing, diffuse erythema of an cystitis cystica with trabeculations and small bladder diverticula. Urethra with right-sided fibrosis likely false passage and/or stricture  UA 04/02/2024 - negative UCx - normal urogenital gene   urogyn: Last PVR in office was normal though previous PVR was up to 400 ccs sp urodynamics 2017 showing obstructive voiding with possible DSD, neuro evaluation negative. sp cystoscopy with dr. Schumacher 2020 showing false passage in urethra, trabeculated bladder with diverticulum sp urethral dilation in office 2022 as pt was unable to catharize.  Pt previously was self-catharizing when she felt incomplete bladder emptying and stopped last year as she had improvement with medication. Currently she is content with voiding and denies frequency or urgency. Denies flank pain, gross hematuria, dysuria or associated symptoms.  Bedside US - difficult to visualize bladder stone  Initial PVR today - 340 cc. Second PVR - 102 cc.  RBUS 12/08/2023 -agree with findings bladder stone seen IMPRESSION: No hydronephrosis or shadowing renal stone. There is a new 1.7 cm bladder stone. Additionally, the bladder wall appears mildly thickened/trabeculated, as can be seen with bladder dysfunction and/or cystitis.  UCx 03/2024 - negative    history: Denies previous kidney stones, recurrent UTIs.  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 Social History: endo PCA at [facility]

## 2024-08-27 ENCOUNTER — APPOINTMENT (OUTPATIENT)
Dept: UROLOGY | Facility: CLINIC | Age: 54
End: 2024-08-27

## 2024-09-11 ENCOUNTER — APPOINTMENT (OUTPATIENT)
Dept: UROGYNECOLOGY | Facility: CLINIC | Age: 54
End: 2024-09-11

## 2024-12-06 ENCOUNTER — EMERGENCY (EMERGENCY)
Facility: HOSPITAL | Age: 54
LOS: 0 days | Discharge: HOME | End: 2024-12-06
Attending: STUDENT IN AN ORGANIZED HEALTH CARE EDUCATION/TRAINING PROGRAM
Payer: COMMERCIAL

## 2024-12-06 VITALS
HEIGHT: 63 IN | SYSTOLIC BLOOD PRESSURE: 159 MMHG | DIASTOLIC BLOOD PRESSURE: 96 MMHG | TEMPERATURE: 98 F | OXYGEN SATURATION: 98 % | RESPIRATION RATE: 19 BRPM | HEART RATE: 70 BPM | WEIGHT: 164.91 LBS

## 2024-12-06 DIAGNOSIS — H10.13 ACUTE ATOPIC CONJUNCTIVITIS, BILATERAL: ICD-10-CM

## 2024-12-06 DIAGNOSIS — Z98.891 HISTORY OF UTERINE SCAR FROM PREVIOUS SURGERY: Chronic | ICD-10-CM

## 2024-12-06 PROCEDURE — 99282 EMERGENCY DEPT VISIT SF MDM: CPT

## 2024-12-06 PROCEDURE — 99283 EMERGENCY DEPT VISIT LOW MDM: CPT

## 2024-12-06 RX ORDER — OLOPATADINE HYDROCHLORIDE 2 MG/ML
1 SOLUTION OPHTHALMIC
Qty: 1 | Refills: 0
Start: 2024-12-06 | End: 2024-12-19

## 2024-12-06 NOTE — ED PROVIDER NOTE - CLINICAL SUMMARY MEDICAL DECISION MAKING FREE TEXT BOX
54-year-old female, no pertinent past medical history, presenting for bilateral eye irritation for the last month, no alleviating or aggravating factors, associated with tearing.  Denies fevers, trauma, vision change, rash.  Well-appearing on exam.  In no acute distress.  Mild conjunctival irritation.  No foreign bodies.  EOMI.  Medication sent to pharmacy.  Discussed return precautions and follow-up outpatient.  Patient comfortable with plan.

## 2024-12-06 NOTE — ED PROVIDER NOTE - NSFOLLOWUPINSTRUCTIONS_ED_ALL_ED_FT
Follow up with your primary medical doctor in 1-2 days     Conjunctivitis    WHAT YOU NEED TO KNOW:    Conjunctivitis, or pink eye, is inflammation of your conjunctiva. The conjunctiva is a thin tissue that covers the front of your eye and the back of your eyelids. The conjunctiva helps protect your eye and keep it moist. Conjunctivitis may be caused by bacteria, allergies, or a virus. If your conjunctivitis is caused by bacteria, it may get better on its own in about 7 days. Viral conjunctivitis can last up to 3 weeks. Conjunctivitis         DISCHARGE INSTRUCTIONS:    Return to the emergency department if:     You have worsening eye pain.       The swelling in your eye gets worse, even after treatment.       Your vision suddenly becomes worse or you cannot see at all.    Contact your healthcare provider if:     You develop a fever and ear pain.      You have tiny bumps or spots of blood on your eye.      You have questions or concerns about your condition or care.    Manage your symptoms:     Apply a cool compress. Wet a washcloth with cold water and place it on your eye. This will help decrease itching and irritation.      Do not wear contact lenses. They can irritate your eye. Throw away the pair you are using and ask when you can wear them again. Use a new pair of lenses when your healthcare provider says it is okay.       Avoid irritants. Stay away from smoke filled areas. Shield your eyes from wind and sun.       Flush your eye. You may need to flush your eye with saline to help decrease your symptoms. Ask for more information on how to flush your eye.     Medicines: Treatment depends on what is causing your conjunctivitis. You maybe given any of the following:    Allergy medicine helps decrease itchy, red, swollen eyes caused by allergies. It may be given as a pill, eye drops, or nasal spray.      Antibiotics may be needed if your conjunctivitis is caused by bacteria. This medicine may be given as a pill, eye drops, or eye ointment.      Take your medicine as directed. Contact your healthcare provider if you think your medicine is not helping or if you have side effects. Tell him or her if you are allergic to any medicine. Keep a list of the medicines, vitamins, and herbs you take. Include the amounts, and when and why you take them. Bring the list or the pill bottles to follow-up visits. Carry your medicine list with you in case of an emergency.    Prevent the spread of conjunctivitis:     Wash your hands with soap and water often. Wash your hands before and after you touch your eyes. Also wash your hands before you prepare or eat food and after you use the bathroom or change a diaper.      Avoid allergens. Try to avoid the things that cause your allergies, such as pets, dust, or grass.       Avoid contact with others. Do not share towels or washcloths. Try to stay away from others as much as possible. Ask when you can return to work or school.       Throw away eye makeup. The bacteria that caused your conjunctivitis can stay in eye makeup. Throw away mascara and other eye makeup.         © Copyright [employer] 2019 All illustrations and images included in CareNotes are the copyrighted property of [employer] or [employer].

## 2024-12-06 NOTE — ED PROVIDER NOTE - PHYSICAL EXAMINATION
Physical Exam    Vital Signs: I have reviewed the initial vital signs.  Constitutional: well-nourished, appears stated age, no acute distress  Eyes: Conjunctiva pink, Sclera clear, PERRLA, EOMI. Bilateral mild ciliary injection, slight watery DC, no matting or crusting   Cardiovascular: S1 and S2, regular rate, regular rhythm, well-perfused extremities, radial pulses equal and 2+  Respiratory: unlabored respiratory effort, clear to auscultation bilaterally no wheezing, rales and rhonchi  Gastrointestinal: soft, non-tender abdomen, no pulsatile mass, normal bowl sounds  Musculoskeletal: supple neck, no lower extremity edema, no midline tenderness  Integumentary: warm, dry, no rash  Neurologic: awake, alert, cranial nerves II-XII grossly intact, extremities’ motor and sensory functions grossly intact  Psychiatric: appropriate mood, appropriate affect

## 2024-12-06 NOTE — ED PROVIDER NOTE - OBJECTIVE STATEMENT
Pt is a 54 female with PMH noted in chart presents to ED with complaints of BL eye redness, watering burning and itching for last 12 hours. pt states has had in past, thinks it might be related to chemical exposure in [place] which irritated her eyes. Pt washed eyes out, some relief. no chemicals got in eyes. Denies any matting, crusting, painful eom, blurry vision. no other complaints

## 2024-12-06 NOTE — ED PROVIDER NOTE - PATIENT PORTAL LINK FT
You can access the FollowMyHealth Patient Portal offered by [facility] by registering at the following website: [email or web address]. By joining [employer]’s FollowMyHealth portal, you will also be able to view your health information using other applications (apps) compatible with our system.

## 2024-12-06 NOTE — ED ADULT NURSE NOTE - OBJECTIVE STATEMENT
Patient reports bilat 9eye burning irritation for a while, more acute this last week with possible reaction to chemicals in OR.

## 2025-01-29 ENCOUNTER — APPOINTMENT (OUTPATIENT)
Dept: UROGYNECOLOGY | Facility: CLINIC | Age: 55
End: 2025-01-29
Payer: COMMERCIAL

## 2025-01-29 VITALS
BODY MASS INDEX: 27.83 KG/M2 | SYSTOLIC BLOOD PRESSURE: 139 MMHG | DIASTOLIC BLOOD PRESSURE: 72 MMHG | HEIGHT: 64 IN | WEIGHT: 163 LBS | HEART RATE: 74 BPM

## 2025-01-29 DIAGNOSIS — R33.9 RETENTION OF URINE, UNSPECIFIED: ICD-10-CM

## 2025-01-29 DIAGNOSIS — N36.44 MUSCULAR DISORDERS OF URETHRA: ICD-10-CM

## 2025-01-29 PROCEDURE — 99214 OFFICE O/P EST MOD 30 MIN: CPT

## 2025-01-29 PROCEDURE — G2211 COMPLEX E/M VISIT ADD ON: CPT

## 2025-01-29 RX ORDER — AMLODIPINE BESYLATE 5 MG/1
5 TABLET ORAL
Refills: 0 | Status: ACTIVE | COMMUNITY

## 2025-01-31 ENCOUNTER — RX RENEWAL (OUTPATIENT)
Age: 55
End: 2025-01-31

## 2025-08-01 ENCOUNTER — RX RENEWAL (OUTPATIENT)
Age: 55
End: 2025-08-01

## 2025-09-22 PROBLEM — R12 HEARTBURN: Status: ACTIVE | Noted: 2025-09-22

## 2025-09-22 PROBLEM — Z12.11 ENCOUNTER FOR SCREENING COLONOSCOPY: Status: ACTIVE | Noted: 2025-09-22 | Resolved: 2025-10-06

## 2025-09-22 PROBLEM — Z78.9 CAFFEINE USE: Status: ACTIVE | Noted: 2025-09-22

## 2025-09-22 RX ORDER — BACLOFEN 5 MG/1
5 TABLET ORAL
Qty: 60 | Refills: 1 | Status: DISCONTINUED | COMMUNITY
Start: 2025-09-19 | End: 2025-09-22